# Patient Record
Sex: MALE | Race: WHITE | NOT HISPANIC OR LATINO | Employment: FULL TIME | URBAN - METROPOLITAN AREA
[De-identification: names, ages, dates, MRNs, and addresses within clinical notes are randomized per-mention and may not be internally consistent; named-entity substitution may affect disease eponyms.]

---

## 2018-09-03 ENCOUNTER — HOSPITAL ENCOUNTER (EMERGENCY)
Facility: HOSPITAL | Age: 43
Discharge: HOME | End: 2018-09-03
Attending: EMERGENCY MEDICINE
Payer: COMMERCIAL

## 2018-09-03 VITALS
DIASTOLIC BLOOD PRESSURE: 89 MMHG | SYSTOLIC BLOOD PRESSURE: 139 MMHG | TEMPERATURE: 99 F | HEART RATE: 88 BPM | BODY MASS INDEX: 27.24 KG/M2 | OXYGEN SATURATION: 95 % | WEIGHT: 173.94 LBS | RESPIRATION RATE: 18 BRPM

## 2018-09-03 DIAGNOSIS — J40 BRONCHITIS: ICD-10-CM

## 2018-09-03 DIAGNOSIS — R05.9 COUGH: ICD-10-CM

## 2018-09-03 DIAGNOSIS — Z72.0 TOBACCO ABUSE: ICD-10-CM

## 2018-09-03 DIAGNOSIS — B34.9 ACUTE VIRAL SYNDROME: Primary | ICD-10-CM

## 2018-09-03 LAB
FLUAV AG SPEC QL IA: NEGATIVE
FLUBV AG SPEC QL IA: NEGATIVE
SPECIMEN SOURCE: NORMAL

## 2018-09-03 PROCEDURE — 99284 EMERGENCY DEPT VISIT MOD MDM: CPT | Mod: 25

## 2018-09-03 PROCEDURE — 25000242 PHARM REV CODE 250 ALT 637 W/ HCPCS: Performed by: EMERGENCY MEDICINE

## 2018-09-03 PROCEDURE — 94640 AIRWAY INHALATION TREATMENT: CPT

## 2018-09-03 PROCEDURE — 63600175 PHARM REV CODE 636 W HCPCS: Performed by: EMERGENCY MEDICINE

## 2018-09-03 PROCEDURE — 87400 INFLUENZA A/B EACH AG IA: CPT

## 2018-09-03 PROCEDURE — 99900035 HC TECH TIME PER 15 MIN (STAT)

## 2018-09-03 RX ORDER — ALBUTEROL SULFATE 90 UG/1
1-2 AEROSOL, METERED RESPIRATORY (INHALATION) EVERY 6 HOURS PRN
Qty: 1 INHALER | Refills: 0 | Status: SHIPPED | OUTPATIENT
Start: 2018-09-03 | End: 2019-09-03

## 2018-09-03 RX ORDER — PREDNISONE 20 MG/1
60 TABLET ORAL
Status: COMPLETED | OUTPATIENT
Start: 2018-09-03 | End: 2018-09-03

## 2018-09-03 RX ORDER — ONDANSETRON 4 MG/1
4 TABLET, ORALLY DISINTEGRATING ORAL EVERY 8 HOURS PRN
Qty: 12 TABLET | Refills: 0 | Status: SHIPPED | OUTPATIENT
Start: 2018-09-03 | End: 2019-09-22 | Stop reason: CLARIF

## 2018-09-03 RX ORDER — PREDNISONE 10 MG/1
10 TABLET ORAL DAILY
Qty: 21 TABLET | Refills: 0 | Status: SHIPPED | OUTPATIENT
Start: 2018-09-03 | End: 2018-09-13

## 2018-09-03 RX ORDER — BENZONATATE 100 MG/1
100 CAPSULE ORAL 3 TIMES DAILY PRN
Qty: 20 CAPSULE | Refills: 0 | Status: SHIPPED | OUTPATIENT
Start: 2018-09-03 | End: 2018-09-13

## 2018-09-03 RX ORDER — GABAPENTIN 600 MG/1
600 TABLET ORAL 3 TIMES DAILY
COMMUNITY
End: 2024-01-03

## 2018-09-03 RX ORDER — CETIRIZINE HYDROCHLORIDE 10 MG/1
10 TABLET ORAL DAILY
Qty: 30 TABLET | Refills: 0 | Status: SHIPPED | OUTPATIENT
Start: 2018-09-03 | End: 2019-09-22 | Stop reason: CLARIF

## 2018-09-03 RX ORDER — IPRATROPIUM BROMIDE AND ALBUTEROL SULFATE 2.5; .5 MG/3ML; MG/3ML
3 SOLUTION RESPIRATORY (INHALATION)
Status: COMPLETED | OUTPATIENT
Start: 2018-09-03 | End: 2018-09-03

## 2018-09-03 RX ADMIN — PREDNISONE 60 MG: 20 TABLET ORAL at 06:09

## 2018-09-03 RX ADMIN — IPRATROPIUM BROMIDE AND ALBUTEROL SULFATE 3 ML: .5; 3 SOLUTION RESPIRATORY (INHALATION) at 06:09

## 2018-09-03 NOTE — ED PROVIDER NOTES
Encounter Date: 9/3/2018       History     Chief Complaint   Patient presents with    Emesis     Pt states he thinks he has a stomach bug, started vomiting saturday. Last time vomiting was yesterday evening      The history is provided by the patient.   Cough   This is a new problem. Illness onset: 2 days ago. The problem has been waxing and waning. The cough is non-productive. Maximum temperature: subjective fever. The fever has been present for 1 to 2 days. Associated symptoms include headaches, rhinorrhea, myalgias and wheezing. Pertinent negatives include no chest pain, no chills, no sweats, no sore throat and no shortness of breath. He has tried decongestants and cough syrup for the symptoms. The treatment provided mild relief. He is a smoker. His past medical history is significant for asthma. His past medical history does not include bronchitis, pneumonia, COPD or emphysema.     Review of patient's allergies indicates:  No Known Allergies  Past Medical History:   Diagnosis Date    Chronic pain     associated with injury     Past Surgical History:   Procedure Laterality Date    BRAIN SURGERY      EYE SURGERY      SHOULDER SURGERY      post trauma fell 19 feet     Family History   Problem Relation Age of Onset    Peripheral vascular disease Mother     Hypertension Mother     Cancer Father      Social History     Tobacco Use    Smoking status: Current Every Day Smoker     Types: Cigarettes    Smokeless tobacco: Never Used   Substance Use Topics    Alcohol use: No     Frequency: Never    Drug use: No     Review of Systems   Constitutional: Negative for chills and fever.   HENT: Positive for rhinorrhea. Negative for sore throat.    Respiratory: Positive for cough and wheezing. Negative for shortness of breath.    Cardiovascular: Negative for chest pain.   Gastrointestinal: Negative for nausea.   Genitourinary: Negative for dysuria.   Musculoskeletal: Positive for myalgias. Negative for back pain.    Skin: Negative for rash.   Neurological: Positive for headaches. Negative for weakness.   Hematological: Does not bruise/bleed easily.   All other systems reviewed and are negative.      Physical Exam     Initial Vitals [09/03/18 0553]   BP Pulse Resp Temp SpO2   (!) 151/93 81 18 98.8 °F (37.1 °C) 97 %      MAP       --         Vitals:    09/03/18 0553 09/03/18 0632 09/03/18 0658   BP: (!) 151/93  139/89   Pulse: 81 80 88   Resp: 18 18 18   Temp: 98.8 °F (37.1 °C)     TempSrc: Oral     SpO2: 97% 97% 95%   Weight: 78.9 kg (173 lb 15.1 oz)         Physical Exam    Nursing note and vitals reviewed.  Constitutional: He appears well-developed and well-nourished.   HENT:   Head: Normocephalic and atraumatic.   Mouth/Throat: Oropharynx is clear and moist.   Eyes: EOM are normal. Pupils are equal, round, and reactive to light.   Neck: Normal range of motion. Neck supple.   Cardiovascular: Normal rate, regular rhythm, normal heart sounds and intact distal pulses.   Pulmonary/Chest: No respiratory distress. He has wheezes. He has no rhonchi.   Expiratory wheezes bilaterally with coarse breath sounds   Abdominal: Soft. Bowel sounds are normal. There is no rebound.   Musculoskeletal: Normal range of motion. He exhibits no edema.   Neurological: He is alert and oriented to person, place, and time. He has normal strength. No cranial nerve deficit or sensory deficit.   Skin: Skin is warm and dry. Capillary refill takes less than 2 seconds. No rash noted.   Psychiatric: He has a normal mood and affect. His behavior is normal. Judgment and thought content normal.         ED Course   Procedures  Labs Reviewed   INFLUENZA A AND B ANTIGEN          Imaging Results          X-Ray Chest PA And Lateral (Final result)  Result time 09/03/18 07:21:26    Final result by ALBA Cagle Sr., MD (09/03/18 07:21:26)                 Impression:      1. The lungs are clear.  2. 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 The medial fracture fragment of the left clavicle is displaced superiorly by 13 mm.  3. There are healed fractures on the left side of the thoracic cage.  .      Electronically signed by: Bolivar Cagle MD  Date:    09/03/2018  Time:    07:21             Narrative:    EXAMINATION:  XR CHEST PA AND LATERAL    CLINICAL HISTORY:  Cough    COMPARISON:  None    FINDINGS:  The size and contour of the heart are normal. The lungs are clear. There is no pneumothorax or pleural effusion.  There are healed fractures on the left side of the thoracic cage.  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  The medial fracture fragment of the left clavicle is displaced superiorly by 13 mm.                                            Patient presents with upper respiratory and flulike symptoms. Based on my assessment in the ED, I do not suspect any respiratory, airway, pulmonary, cardiovascular (including myocarditis), metabolic, CNS, medical, or surgical emergency medical condition. I have discussed with the patient and/or caregiver signs and symptoms for secondary bacterial infections, such as pneumonia. I believe that the patient's symptoms are most consistent with a viral illness, possibly influenza. Patient is safe for discharge home with conservative therapy.           Clinical Impression:   The primary encounter diagnosis was Acute viral syndrome. Diagnoses of Cough, Tobacco abuse, and Bronchitis were also pertinent to this visit.      Disposition:   Disposition: Discharged  Condition: Stable                        Kayden Lin MD  09/03/18 0747       Kayden Lin MD  09/03/18 0751

## 2019-04-13 ENCOUNTER — HOSPITAL ENCOUNTER (EMERGENCY)
Facility: HOSPITAL | Age: 44
Discharge: HOME | End: 2019-04-13
Attending: EMERGENCY MEDICINE
Payer: COMMERCIAL

## 2019-04-13 VITALS
OXYGEN SATURATION: 97 % | TEMPERATURE: 99 F | HEART RATE: 94 BPM | HEIGHT: 67 IN | DIASTOLIC BLOOD PRESSURE: 78 MMHG | WEIGHT: 174.19 LBS | SYSTOLIC BLOOD PRESSURE: 132 MMHG | RESPIRATION RATE: 18 BRPM | BODY MASS INDEX: 27.34 KG/M2

## 2019-04-13 DIAGNOSIS — J06.9 UPPER RESPIRATORY TRACT INFECTION, UNSPECIFIED TYPE: ICD-10-CM

## 2019-04-13 DIAGNOSIS — R50.9 FEVER: ICD-10-CM

## 2019-04-13 DIAGNOSIS — J40 BRONCHITIS: Primary | ICD-10-CM

## 2019-04-13 LAB
ALBUMIN SERPL BCP-MCNC: 3.7 G/DL (ref 3.5–5.2)
ALP SERPL-CCNC: 69 U/L (ref 55–135)
ALT SERPL W/O P-5'-P-CCNC: 11 U/L (ref 10–44)
ANION GAP SERPL CALC-SCNC: 11 MMOL/L (ref 8–16)
AST SERPL-CCNC: 11 U/L (ref 10–40)
BASOPHILS # BLD AUTO: 0.03 K/UL (ref 0–0.2)
BASOPHILS NFR BLD: 0.2 % (ref 0–1.9)
BILIRUB SERPL-MCNC: 0.5 MG/DL (ref 0.1–1)
BILIRUB UR QL STRIP: NEGATIVE
BNP SERPL-MCNC: 77 PG/ML (ref 0–99)
BUN SERPL-MCNC: 17 MG/DL (ref 6–20)
CALCIUM SERPL-MCNC: 10.1 MG/DL (ref 8.7–10.5)
CHLORIDE SERPL-SCNC: 103 MMOL/L (ref 95–110)
CLARITY UR REFRACT.AUTO: CLEAR
CO2 SERPL-SCNC: 23 MMOL/L (ref 23–29)
COLOR UR AUTO: YELLOW
CREAT SERPL-MCNC: 1.2 MG/DL (ref 0.5–1.4)
DIFFERENTIAL METHOD: ABNORMAL
EOSINOPHIL # BLD AUTO: 0.2 K/UL (ref 0–0.5)
EOSINOPHIL NFR BLD: 1.4 % (ref 0–8)
ERYTHROCYTE [DISTWIDTH] IN BLOOD BY AUTOMATED COUNT: 14.2 % (ref 11.5–14.5)
EST. GFR  (AFRICAN AMERICAN): >60 ML/MIN/1.73 M^2
EST. GFR  (NON AFRICAN AMERICAN): >60 ML/MIN/1.73 M^2
GLUCOSE SERPL-MCNC: 100 MG/DL (ref 70–110)
GLUCOSE UR QL STRIP: NEGATIVE
HCT VFR BLD AUTO: 40.4 % (ref 40–54)
HGB BLD-MCNC: 14.1 G/DL (ref 14–18)
HGB UR QL STRIP: NEGATIVE
INFLUENZA A, MOLECULAR: NEGATIVE
INFLUENZA B, MOLECULAR: NEGATIVE
INR PPP: 1 (ref 0.8–1.2)
KETONES UR QL STRIP: NEGATIVE
LACTATE SERPL-SCNC: 1.3 MMOL/L (ref 0.5–2.2)
LEUKOCYTE ESTERASE UR QL STRIP: NEGATIVE
LIPASE SERPL-CCNC: 14 U/L (ref 4–60)
LYMPHOCYTES # BLD AUTO: 0.9 K/UL (ref 1–4.8)
LYMPHOCYTES NFR BLD: 7.1 % (ref 18–48)
MAGNESIUM SERPL-MCNC: 1.9 MG/DL (ref 1.6–2.6)
MCH RBC QN AUTO: 29.4 PG (ref 27–31)
MCHC RBC AUTO-ENTMCNC: 34.9 G/DL (ref 32–36)
MCV RBC AUTO: 84 FL (ref 82–98)
MONOCYTES # BLD AUTO: 1 K/UL (ref 0.3–1)
MONOCYTES NFR BLD: 8 % (ref 4–15)
NEUTROPHILS # BLD AUTO: 10.3 K/UL (ref 1.8–7.7)
NEUTROPHILS NFR BLD: 83.1 % (ref 38–73)
NITRITE UR QL STRIP: NEGATIVE
PH UR STRIP: 6 [PH] (ref 5–8)
PHOSPHATE SERPL-MCNC: 3.6 MG/DL (ref 2.7–4.5)
PLATELET # BLD AUTO: 176 K/UL (ref 150–350)
PMV BLD AUTO: 10.8 FL (ref 9.2–12.9)
POTASSIUM SERPL-SCNC: 3.7 MMOL/L (ref 3.5–5.1)
PROCALCITONIN SERPL IA-MCNC: 0.12 NG/ML
PROT SERPL-MCNC: 6.4 G/DL (ref 6–8.4)
PROT UR QL STRIP: NEGATIVE
PROTHROMBIN TIME: 10 SEC (ref 9–12.5)
RBC # BLD AUTO: 4.8 M/UL (ref 4.6–6.2)
SODIUM SERPL-SCNC: 137 MMOL/L (ref 136–145)
SP GR UR STRIP: 1.01 (ref 1–1.03)
SPECIMEN SOURCE: NORMAL
TROPONIN I SERPL DL<=0.01 NG/ML-MCNC: 0.11 NG/ML (ref 0–0.03)
URN SPEC COLLECT METH UR: NORMAL
UROBILINOGEN UR STRIP-ACNC: NEGATIVE EU/DL
WBC # BLD AUTO: 12.44 K/UL (ref 3.9–12.7)

## 2019-04-13 PROCEDURE — 84100 ASSAY OF PHOSPHORUS: CPT | Mod: ER

## 2019-04-13 PROCEDURE — 83735 ASSAY OF MAGNESIUM: CPT | Mod: ER

## 2019-04-13 PROCEDURE — 93010 ELECTROCARDIOGRAM REPORT: CPT | Mod: ,,, | Performed by: NUCLEAR MEDICINE

## 2019-04-13 PROCEDURE — 99284 EMERGENCY DEPT VISIT MOD MDM: CPT | Mod: 25,ER

## 2019-04-13 PROCEDURE — 87040 BLOOD CULTURE FOR BACTERIA: CPT

## 2019-04-13 PROCEDURE — 96374 THER/PROPH/DIAG INJ IV PUSH: CPT | Mod: ER

## 2019-04-13 PROCEDURE — 83880 ASSAY OF NATRIURETIC PEPTIDE: CPT | Mod: ER

## 2019-04-13 PROCEDURE — 84145 PROCALCITONIN (PCT): CPT | Mod: ER

## 2019-04-13 PROCEDURE — 93005 ELECTROCARDIOGRAM TRACING: CPT | Mod: ER

## 2019-04-13 PROCEDURE — 25000242 PHARM REV CODE 250 ALT 637 W/ HCPCS: Mod: ER | Performed by: EMERGENCY MEDICINE

## 2019-04-13 PROCEDURE — 93010 EKG 12-LEAD: ICD-10-PCS | Mod: ,,, | Performed by: NUCLEAR MEDICINE

## 2019-04-13 PROCEDURE — 27000221 HC OXYGEN, UP TO 24 HOURS: Mod: ER

## 2019-04-13 PROCEDURE — 85025 COMPLETE CBC W/AUTO DIFF WBC: CPT | Mod: ER

## 2019-04-13 PROCEDURE — 80053 COMPREHEN METABOLIC PANEL: CPT | Mod: ER

## 2019-04-13 PROCEDURE — 87502 INFLUENZA DNA AMP PROBE: CPT | Mod: ER

## 2019-04-13 PROCEDURE — 94640 AIRWAY INHALATION TREATMENT: CPT | Mod: ER

## 2019-04-13 PROCEDURE — 85610 PROTHROMBIN TIME: CPT | Mod: ER

## 2019-04-13 PROCEDURE — 81003 URINALYSIS AUTO W/O SCOPE: CPT | Mod: ER

## 2019-04-13 PROCEDURE — 63600175 PHARM REV CODE 636 W HCPCS: Mod: ER | Performed by: EMERGENCY MEDICINE

## 2019-04-13 PROCEDURE — 83605 ASSAY OF LACTIC ACID: CPT | Mod: ER

## 2019-04-13 PROCEDURE — 83690 ASSAY OF LIPASE: CPT | Mod: ER

## 2019-04-13 PROCEDURE — 84484 ASSAY OF TROPONIN QUANT: CPT | Mod: ER

## 2019-04-13 PROCEDURE — 93041 RHYTHM ECG TRACING: CPT | Mod: ER

## 2019-04-13 PROCEDURE — 25000003 PHARM REV CODE 250: Mod: ER | Performed by: EMERGENCY MEDICINE

## 2019-04-13 RX ORDER — IPRATROPIUM BROMIDE AND ALBUTEROL SULFATE 2.5; .5 MG/3ML; MG/3ML
3 SOLUTION RESPIRATORY (INHALATION)
Status: COMPLETED | OUTPATIENT
Start: 2019-04-13 | End: 2019-04-13

## 2019-04-13 RX ORDER — AZITHROMYCIN 250 MG/1
250 TABLET, FILM COATED ORAL DAILY
Qty: 6 TABLET | Refills: 0 | Status: SHIPPED | OUTPATIENT
Start: 2019-04-13 | End: 2019-09-22 | Stop reason: CLARIF

## 2019-04-13 RX ORDER — OXYCODONE HYDROCHLORIDE 30 MG/1
5 TABLET ORAL 3 TIMES DAILY PRN
COMMUNITY
End: 2020-04-24

## 2019-04-13 RX ORDER — METHYLPREDNISOLONE 4 MG/1
TABLET ORAL
Qty: 1 PACKAGE | Refills: 0 | Status: SHIPPED | OUTPATIENT
Start: 2019-04-13 | End: 2019-09-22 | Stop reason: CLARIF

## 2019-04-13 RX ORDER — METHYLPREDNISOLONE SOD SUCC 125 MG
125 VIAL (EA) INJECTION
Status: COMPLETED | OUTPATIENT
Start: 2019-04-13 | End: 2019-04-13

## 2019-04-13 RX ORDER — ALBUTEROL SULFATE 90 UG/1
1-2 AEROSOL, METERED RESPIRATORY (INHALATION) EVERY 6 HOURS PRN
Qty: 1 INHALER | Refills: 0 | OUTPATIENT
Start: 2019-04-13 | End: 2019-09-22

## 2019-04-13 RX ADMIN — METHYLPREDNISOLONE SODIUM SUCCINATE 125 MG: 125 INJECTION, POWDER, FOR SOLUTION INTRAMUSCULAR; INTRAVENOUS at 06:04

## 2019-04-13 RX ADMIN — IPRATROPIUM BROMIDE AND ALBUTEROL SULFATE 3 ML: .5; 3 SOLUTION RESPIRATORY (INHALATION) at 05:04

## 2019-04-13 RX ADMIN — SODIUM CHLORIDE 2370 ML: 0.9 INJECTION, SOLUTION INTRAVENOUS at 05:04

## 2019-04-13 NOTE — DISCHARGE INSTRUCTIONS
Regarding UPPER RESPIRATORY ILLNESS, for treatment, I encouraged patient to: drink plenty of fluids; get lots of rest; take medications as prescribed; use over-the-counter medications for symptomatic treatment;  and use a humidifier or steam in the bathroom to improve patency of upper airway.  Patient instructed to notify primary care provider, or return to the emergency department, if the they: have a cough most days or have a cough that returns frequently; begin coughing up blood; develop a high fever or shaking chills; have a low-grade fever for three or more days; develop thick, greenish mucus, especially if it has a bad smell; and experience shortness of breath or chest pain. For prevention, discussed with patient the importance of refraining from smoking (if applicable), getting annual influenza vaccines, reducing exposure to air pollution, and the need to frequently wash hands to avoid spread of infection.     Rest  Drink plenty of clear fluids   Nasal saline spray to clear nasal drainage and help with nasal congestion  Zyrtec or Claritin to help dry mucus and post nasal drip  Mucinex or Mucinex DM for cough and chest congestion  Tylenol or Ibuprofen for fever, headache and body aches  Warm salt water gargles for throat comfort  Chloraseptic spray or lozenges for throat comfort  RTC with no improvement or worsening    Regarding BRONCHITIS, for treatment, I encouraged patient to refrain from smoking, drink plenty of fluids, rest, take medications as prescribed, and to use a humidifier or steam in the bathroom. Patient instructed to notify primary care provider if: they have a cough most days or have a cough that returns frequently; are coughing up blood; have a high fever or shaking chills; have a low-grade fever for three or more days; develop thick, greenish mucus, especially if it has a bad smell; and feel short of breath or have chest pain. For prevention, discussed with patient the importance of not  smoking, getting annual influenza vaccines, reducing exposure to air pollution, and to frequently wash hands to avoid spread of infection.  Instructed patient to return to emergency department if they experience chest pain or shortness of breath and to follow up with primary care provider for re-evaluation.

## 2019-04-13 NOTE — ED PROVIDER NOTES
Encounter Date: 4/13/2019       History     Chief Complaint   Patient presents with    Fever     and body aches with chest pain.     The history is provided by the patient.   Shortness of Breath   This is a new problem. The problem occurs rarely.The current episode started 2 days ago. The problem has not changed since onset.Associated symptoms include a fever, sore throat, cough, sputum production and wheezing. Pertinent negatives include no headaches, no coryza, no rhinorrhea, no swollen glands, no ear pain, no neck pain, no hemoptysis, no PND, no orthopnea, no chest pain, no syncope, no vomiting, no abdominal pain, no rash, no leg pain, no leg swelling and no claudication. The problem's precipitants include smoke (pt states he smokes 2ppd and has been having worsening sob c cough). Risk factors include smoking. He has tried nothing for the symptoms. The treatment provided no relief. He has had no prior hospitalizations. He has had no prior ED visits. He has had no prior ICU admissions. Associated medical issues include COPD. Associated medical issues do not include asthma, pneumonia, chronic lung disease, PE, CAD, heart failure, past MI, DVT or recent surgery.     Review of patient's allergies indicates:  No Known Allergies  Past Medical History:   Diagnosis Date    Chronic pain     associated with injury     Past Surgical History:   Procedure Laterality Date    BRAIN SURGERY      EYE SURGERY      SHOULDER SURGERY      post trauma fell 19 feet     Family History   Problem Relation Age of Onset    Peripheral vascular disease Mother     Hypertension Mother     Cancer Father      Social History     Tobacco Use    Smoking status: Current Every Day Smoker     Types: Cigarettes    Smokeless tobacco: Never Used   Substance Use Topics    Alcohol use: No     Frequency: Never    Drug use: No     Review of Systems   Constitutional: Positive for fever.   HENT: Positive for sore throat. Negative for ear pain and  rhinorrhea.    Respiratory: Positive for cough, sputum production, shortness of breath and wheezing. Negative for hemoptysis.    Cardiovascular: Negative for chest pain, orthopnea, claudication, leg swelling, syncope and PND.   Gastrointestinal: Negative for abdominal pain, nausea and vomiting.   Genitourinary: Negative for dysuria.   Musculoskeletal: Negative for back pain and neck pain.   Skin: Negative for rash.   Neurological: Negative for weakness and headaches.   Hematological: Does not bruise/bleed easily.   All other systems reviewed and are negative.      Physical Exam     Initial Vitals [04/13/19 1630]   BP Pulse Resp Temp SpO2   119/73 103 20 98.8 °F (37.1 °C) (!) 91 %      MAP       --         Physical Exam    Nursing note and vitals reviewed.  Constitutional: He appears well-developed and well-nourished. He is not diaphoretic. No distress.   HENT:   Head: Normocephalic and atraumatic.   Right Ear: External ear and ear canal normal. Tympanic membrane is retracted. Decreased hearing is noted.   Left Ear: External ear and ear canal normal. Tympanic membrane is retracted. Decreased hearing is noted.   Nose: Nose normal. Right sinus exhibits no maxillary sinus tenderness and no frontal sinus tenderness. Left sinus exhibits no maxillary sinus tenderness and no frontal sinus tenderness.   Mouth/Throat: Uvula is midline and mucous membranes are normal. Posterior oropharyngeal erythema present. No oropharyngeal exudate or posterior oropharyngeal edema.   Eyes: EOM are normal. Pupils are equal, round, and reactive to light. No scleral icterus.   Neck: Normal range of motion. Neck supple. No thyromegaly present.   Cardiovascular: Normal rate, regular rhythm, normal heart sounds and intact distal pulses. Exam reveals no gallop and no friction rub.    No murmur heard.  Pulmonary/Chest: No respiratory distress. He has decreased breath sounds in the right lower field. He has wheezes in the right middle field, the  right lower field, the left middle field and the left lower field. He has rhonchi in the right middle field, the right lower field, the left middle field and the left lower field. He exhibits no tenderness.   Abdominal: Soft. Bowel sounds are normal. He exhibits no distension. There is no tenderness. There is no rebound and no guarding.   Musculoskeletal: Normal range of motion. He exhibits no edema or tenderness.   Lymphadenopathy:     He has no cervical adenopathy.   Neurological: He is alert and oriented to person, place, and time. He has normal strength. No cranial nerve deficit or sensory deficit.   Skin: Skin is warm and dry.   Psychiatric: He has a normal mood and affect. His behavior is normal. Judgment and thought content normal.         ED Course   Procedures  Labs Reviewed   CBC W/ AUTO DIFFERENTIAL - Abnormal; Notable for the following components:       Result Value    Gran # (ANC) 10.3 (*)     Lymph # 0.9 (*)     Gran% 83.1 (*)     Lymph% 7.1 (*)     All other components within normal limits   TROPONIN I - Abnormal; Notable for the following components:    Troponin I 0.112 (*)     All other components within normal limits   CULTURE, BLOOD    Narrative:     Aerobic and anaerobic   CULTURE, BLOOD    Narrative:     Aerobic and anaerobic   INFLUENZA A & B BY MOLECULAR   COMPREHENSIVE METABOLIC PANEL   LACTIC ACID, PLASMA   URINALYSIS, REFLEX TO URINE CULTURE    Narrative:     Preferred Collection Type->Urine, Clean Catch   MAGNESIUM   PHOSPHORUS   PROTIME-INR   B-TYPE NATRIURETIC PEPTIDE   LIPASE   PROCALCITONIN     EKG Readings: (Independently Interpreted)   Initial Reading: No STEMI. Rhythm: Normal Sinus Rhythm. Heart Rate: 99. Ectopy: No Ectopy. Conduction: Normal. ST Segments: Normal ST Segments. T Waves: Normal. Axis: Normal. Clinical Impression: Normal Sinus Rhythm     ECG Results          EKG 12-lead (Final result)  Result time 04/15/19 11:11:28    Final result by Interface, Lab In [facility] (04/15/19  "11:11:28)                 Narrative:    Test Reason : R50.9,    Vent. Rate : 099 BPM     Atrial Rate : 099 BPM     P-R Int : 132 ms          QRS Dur : 084 ms      QT Int : 332 ms       P-R-T Axes : 065 064 053 degrees     QTc Int : 426 ms    Normal sinus rhythm  Normal ECG  No previous ECGs available  Confirmed by MERVIN ROCHA MD ([id number]) on 4/15/2019 11:11:16 AM    Referred By: [email or web address]   SELF           Confirmed By:MERVIN ROCHA MD                            Imaging Results          X-Ray Chest AP Portable (Final result)  Result time 04/13/19 16:49:59    Final result by Idalia Cortez MD (Timothy) (04/13/19 16:49:59)                 Impression:      Clear lungs.      Electronically signed by: Idalia Cortez MD  Date:    04/13/2019  Time:    16:49             Narrative:    EXAMINATION:  XR CHEST AP PORTABLE    CLINICAL HISTORY:  <Diagnosis>, Sepsis;    COMPARISON:  Comparison 09/03/2018    FINDINGS:  Heart size is normal. The lung fields are clear. No acute cardiopulmonary infiltrate.  Multiple old healed left posterior rib fractures.                                    Vitals:    04/13/19 1630 04/13/19 1655 04/13/19 1717 04/13/19 1733   BP: 119/73  118/67    Pulse: 103 87 87 77   Resp: 20  (!) 21 16   Temp: 98.8 °F (37.1 °C)      TempSrc: Oral      SpO2: (!) 91%  (!) 91% 99%   Weight: 79 kg (174 lb 2.6 oz)      Height: 5' 7" (1.702 m)       04/13/19 1802 04/13/19 1815   BP: 132/78    Pulse: 95 94   Resp: 20 18   Temp:     TempSrc:     SpO2: 98% 97%   Weight:     Height:         Results for orders placed or performed during the hospital encounter of 04/13/19   Blood culture x two cultures. Draw prior to antibiotics.   Result Value Ref Range    Blood Culture, Routine No Growth to date     Blood Culture, Routine No Growth to date     Blood Culture, Routine No Growth to date    Blood culture x two cultures. Draw prior to antibiotics.   Result Value Ref Range    Blood Culture, Routine No Growth to date     " Blood Culture, Routine No Growth to date     Blood Culture, Routine No Growth to date    Influenza A & B by Molecular   Result Value Ref Range    Influenza A, Molecular Negative Negative    Influenza B, Molecular Negative Negative    Flu A & B Source NP    CBC auto differential   Result Value Ref Range    WBC 12.44 3.90 - 12.70 K/uL    RBC 4.80 4.60 - 6.20 M/uL    Hemoglobin 14.1 14.0 - 18.0 g/dL    Hematocrit 40.4 40.0 - 54.0 %    MCV 84 82 - 98 fL    MCH 29.4 27.0 - 31.0 pg    MCHC 34.9 32.0 - 36.0 g/dL    RDW 14.2 11.5 - 14.5 %    Platelets 176 150 - 350 K/uL    MPV 10.8 9.2 - 12.9 fL    Gran # (ANC) 10.3 (H) 1.8 - 7.7 K/uL    Lymph # 0.9 (L) 1.0 - 4.8 K/uL    Mono # 1.0 0.3 - 1.0 K/uL    Eos # 0.2 0.0 - 0.5 K/uL    Baso # 0.03 0.00 - 0.20 K/uL    Gran% 83.1 (H) 38.0 - 73.0 %    Lymph% 7.1 (L) 18.0 - 48.0 %    Mono% 8.0 4.0 - 15.0 %    Eosinophil% 1.4 0.0 - 8.0 %    Basophil% 0.2 0.0 - 1.9 %    Differential Method Automated    Comprehensive metabolic panel   Result Value Ref Range    Sodium 137 136 - 145 mmol/L    Potassium 3.7 3.5 - 5.1 mmol/L    Chloride 103 95 - 110 mmol/L    CO2 23 23 - 29 mmol/L    Glucose 100 70 - 110 mg/dL    BUN, Bld 17 6 - 20 mg/dL    Creatinine 1.2 0.5 - 1.4 mg/dL    Calcium 10.1 8.7 - 10.5 mg/dL    Total Protein 6.4 6.0 - 8.4 g/dL    Albumin 3.7 3.5 - 5.2 g/dL    Total Bilirubin 0.5 0.1 - 1.0 mg/dL    Alkaline Phosphatase 69 55 - 135 U/L    AST 11 10 - 40 U/L    ALT 11 10 - 44 U/L    Anion Gap 11 8 - 16 mmol/L    eGFR if African American >60.0 >60 mL/min/1.73 m^2    eGFR if non African American >60.0 >60 mL/min/1.73 m^2   Lactic acid, plasma #1   Result Value Ref Range    Lactate (Lactic Acid) 1.3 0.5 - 2.2 mmol/L   Urinalysis, Reflex to Urine Culture Urine, Clean Catch   Result Value Ref Range    Specimen UA Urine, Clean Catch     Color, UA Yellow Yellow, Straw, [place]    Appearance, UA Clear Clear    pH, UA 6.0 5.0 - 8.0    Specific Gravity, UA 1.010 1.005 - 1.030    Protein, UA  Negative Negative    Glucose, UA Negative Negative    Ketones, UA Negative Negative    Bilirubin (UA) Negative Negative    Occult Blood UA Negative Negative    Nitrite, UA Negative Negative    Urobilinogen, UA Negative <2.0 EU/dL    Leukocytes, UA Negative Negative   Magnesium   Result Value Ref Range    Magnesium 1.9 1.6 - 2.6 mg/dL   Phosphorus   Result Value Ref Range    Phosphorus 3.6 2.7 - 4.5 mg/dL   Protime-INR   Result Value Ref Range    Prothrombin Time 10.0 9.0 - 12.5 sec    INR 1.0 0.8 - 1.2   Brain natriuretic peptide   Result Value Ref Range    BNP 77 0 - 99 pg/mL   Lipase   Result Value Ref Range    Lipase 14 4 - 60 U/L   Troponin I   Result Value Ref Range    Troponin I 0.112 (H) 0.000 - 0.026 ng/mL   Procalcitonin   Result Value Ref Range    Procalcitonin 0.12 <0.25 ng/mL         Imaging Results          X-Ray Chest AP Portable (Final result)  Result time 04/13/19 16:49:59    Final result by Idalia Cortez MD (Timothy) (04/13/19 16:49:59)                 Impression:      Clear lungs.      Electronically signed by: Idalia Cortez MD  Date:    04/13/2019  Time:    16:49             Narrative:    EXAMINATION:  XR CHEST AP PORTABLE    CLINICAL HISTORY:  <Diagnosis>, Sepsis;    COMPARISON:  Comparison 09/03/2018    FINDINGS:  Heart size is normal. The lung fields are clear. No acute cardiopulmonary infiltrate.  Multiple old healed left posterior rib fractures.                                Medications   sodium chloride 0.9% bolus 2,370 mL (2,370 mLs Intravenous New Bag 4/13/19 9223)   albuterol-ipratropium 2.5 mg-0.5 mg/3 mL nebulizer solution 3 mL (3 mLs Nebulization Given 4/13/19 9103)   methylPREDNISolone sodium succinate injection 125 mg (125 mg Intravenous Given 4/13/19 1813)          5:05 PM  - Re-evaluation:  The patient is resting comfortably and is in no acute distress. Discussed test results and notified of pending labs. Answered questions at this time.     6:05 PM - Re-evaluation: The  patient is resting comfortably and is in no acute distress. He states that his symptoms have improved after treatment within ER. Discussed test results, shared treatment plan, specific conditions for return, and importance of follow up with patient and family.  He understands and agrees with the plan as discussed. Answered  his questions at this time. He has remained hemodynamically stable throughout the ED course and is appropriate for discharge home.     Regarding BRONCHITIS, for treatment, I encouraged patient to refrain from smoking, drink plenty of fluids, rest, take medications as prescribed, and to use a humidifier or steam in the bathroom. Patient instructed to notify primary care provider if: they have a cough most days or have a cough that returns frequently; are coughing up blood; have a high fever or shaking chills; have a low-grade fever for three or more days; develop thick, greenish mucus, especially if it has a bad smell; and feel short of breath or have chest pain. For prevention, discussed with patient the importance of not smoking, getting annual influenza vaccines, reducing exposure to air pollution, and to frequently wash hands to avoid spread of infection.  Instructed patient to return to emergency department if they experience chest pain or shortness of breath and to follow up with primary care provider for re-evaluation.    Regarding UPPER RESPIRATORY ILLNESS, for treatment, I encouraged patient to: drink plenty of fluids; get lots of rest; take medications as prescribed; use over-the-counter medications for symptomatic treatment;  and use a humidifier or steam in the bathroom to improve patency of upper airway.  Patient instructed to notify primary care provider, or return to the emergency department, if the they: have a cough most days or have a cough that returns frequently; begin coughing up blood; develop a high fever or shaking chills; have a low-grade fever for three or more days;  develop thick, greenish mucus, especially if it has a bad smell; and experience shortness of breath or chest pain. For prevention, discussed with patient the importance of refraining from smoking (if applicable), getting annual influenza vaccines, reducing exposure to air pollution, and the need to frequently wash hands to avoid spread of infection.     Pre-hypertension/Hypertension: The pt has been informed that they may have pre-hypertension or hypertension based on a blood pressure reading in the ED. I recommend that the pt call the PCP listed on their discharge instructions or a physician of their choice this week to arrange f/u for further evaluation of possible pre-hypertension or hypertension.     Toño Nelson was given a handout which discussed their disease process, precautions, and instructions for follow-up and therapy.    Follow-up Information     Aurelio Dockery DO. Schedule an appointment as soon as possible for a visit in 1 week.    Specialty:  Family Medicine  Contact information:  [address] [address] [address]  [phone]             [facility].    Specialty:  Emergency Medicine  Why:  As needed, If symptoms worsen  Contact information:  [address] [address] [id number] [phone]                    Medication List      START taking these medications    azithromycin 250 MG tablet  Commonly known as:  Z-DONN  Take 1 tablet (250 mg total) by mouth once daily. Take first 2 tablets together, then 1 every day until finished.     methylPREDNISolone 4 mg tablet  Commonly known as:  MEDROL DOSEPACK  use as directed        CHANGE how you take these medications    * albuterol 90 mcg/actuation inhaler  Commonly known as:  PROVENTIL/VENTOLIN HFA  Inhale 1-2 puffs into the lungs every 6 (six) hours as needed for Wheezing (coughing).  What changed:  Another medication with the same name was added. Make sure you understand how and when to take each.     *  albuterol 90 mcg/actuation inhaler  Commonly known as:  PROVENTIL/VENTOLIN HFA  Inhale 1-2 puffs into the lungs every 6 (six) hours as needed for Wheezing. Rescue  What changed:  You were already taking a medication with the same name, and this prescription was added. Make sure you understand how and when to take each.         * This list has 2 medication(s) that are the same as other medications prescribed for you. Read the directions carefully, and ask your doctor or other care provider to review them with you.            ASK your doctor about these medications    cetirizine 10 MG tablet  Commonly known as:  ZYRTEC  Take 1 tablet (10 mg total) by mouth once daily.     cyclobenzaprine 10 MG tablet  Commonly known as:  FLEXERIL     gabapentin 600 MG tablet  Commonly known as:  NEURONTIN     ondansetron 4 MG Tbdl  Commonly known as:  ZOFRAN-ODT  Take 1 tablet (4 mg total) by mouth every 8 (eight) hours as needed.     oxyCODONE 30 MG Tab  Commonly known as:  ROXICODONE     oxyCODONE-acetaminophen  mg per tablet  Commonly known as:  PERCOCET     promethazine 25 MG tablet  Commonly known as:  PHENERGAN  Take 1 tablet (25 mg total) by mouth every 6 (six) hours as needed for Nausea.     zolpidem 12.5 MG CR tablet  Commonly known as:  AMBIEN CR           Where to Get Your Medications      You can get these medications from any pharmacy    Bring a paper prescription for each of these medications  · albuterol 90 mcg/actuation inhaler  · azithromycin 250 MG tablet  · methylPREDNISolone 4 mg tablet        Current Discharge Medication List            ED Diagnosis  1. Bronchitis    2. Fever    3. Upper respiratory tract infection, unspecified type                             Clinical Impression:       ICD-10-CM ICD-9-CM   1. Bronchitis J40 490   2. Fever R50.9 780.60   3. Upper respiratory tract infection, unspecified type J06.9 465.9         Disposition:   Disposition: Discharged  Condition: Stable                         Jarred Walker Jr., MD  04/17/19 4075

## 2019-04-19 LAB
BACTERIA BLD CULT: NORMAL
BACTERIA BLD CULT: NORMAL

## 2019-09-22 ENCOUNTER — HOSPITAL ENCOUNTER (EMERGENCY)
Facility: HOSPITAL | Age: 44
Discharge: HOME | End: 2019-09-22
Attending: EMERGENCY MEDICINE
Payer: COMMERCIAL

## 2019-09-22 VITALS
SYSTOLIC BLOOD PRESSURE: 126 MMHG | RESPIRATION RATE: 18 BRPM | HEIGHT: 67 IN | TEMPERATURE: 98 F | HEART RATE: 63 BPM | DIASTOLIC BLOOD PRESSURE: 84 MMHG | WEIGHT: 157.88 LBS | BODY MASS INDEX: 24.78 KG/M2 | OXYGEN SATURATION: 97 %

## 2019-09-22 DIAGNOSIS — R20.0 NUMBNESS OF FINGERS: ICD-10-CM

## 2019-09-22 DIAGNOSIS — Z72.0 TOBACCO ABUSE: Primary | ICD-10-CM

## 2019-09-22 DIAGNOSIS — R07.9 CHEST PAIN: ICD-10-CM

## 2019-09-22 DIAGNOSIS — R10.13 DYSPEPSIA: ICD-10-CM

## 2019-09-22 DIAGNOSIS — R03.0 ELEVATED BLOOD PRESSURE READING: ICD-10-CM

## 2019-09-22 LAB
ALBUMIN SERPL BCP-MCNC: 4.5 G/DL (ref 3.5–5.2)
ALP SERPL-CCNC: 89 U/L (ref 55–135)
ALT SERPL W/O P-5'-P-CCNC: 14 U/L (ref 10–44)
ANION GAP SERPL CALC-SCNC: 10 MMOL/L (ref 8–16)
AST SERPL-CCNC: 8 U/L (ref 10–40)
BASOPHILS # BLD AUTO: 0.05 K/UL (ref 0–0.2)
BASOPHILS NFR BLD: 0.7 % (ref 0–1.9)
BILIRUB SERPL-MCNC: 0.6 MG/DL (ref 0.1–1)
BNP SERPL-MCNC: 21 PG/ML (ref 0–99)
BUN SERPL-MCNC: 17 MG/DL (ref 6–20)
CALCIUM SERPL-MCNC: 9.6 MG/DL (ref 8.7–10.5)
CHLORIDE SERPL-SCNC: 108 MMOL/L (ref 95–110)
CO2 SERPL-SCNC: 25 MMOL/L (ref 23–29)
CREAT SERPL-MCNC: 1.3 MG/DL (ref 0.5–1.4)
DIFFERENTIAL METHOD: NORMAL
EOSINOPHIL # BLD AUTO: 0.1 K/UL (ref 0–0.5)
EOSINOPHIL NFR BLD: 1.9 % (ref 0–8)
ERYTHROCYTE [DISTWIDTH] IN BLOOD BY AUTOMATED COUNT: 14.4 % (ref 11.5–14.5)
EST. GFR  (AFRICAN AMERICAN): >60 ML/MIN/1.73 M^2
EST. GFR  (NON AFRICAN AMERICAN): >60 ML/MIN/1.73 M^2
GLUCOSE SERPL-MCNC: 85 MG/DL (ref 70–110)
HCT VFR BLD AUTO: 45 % (ref 40–54)
HGB BLD-MCNC: 15.9 G/DL (ref 14–18)
LYMPHOCYTES # BLD AUTO: 1.8 K/UL (ref 1–4.8)
LYMPHOCYTES NFR BLD: 23.6 % (ref 18–48)
MCH RBC QN AUTO: 29.9 PG (ref 27–31)
MCHC RBC AUTO-ENTMCNC: 35.3 G/DL (ref 32–36)
MCV RBC AUTO: 85 FL (ref 82–98)
MONOCYTES # BLD AUTO: 0.7 K/UL (ref 0.3–1)
MONOCYTES NFR BLD: 9.2 % (ref 4–15)
NEUTROPHILS # BLD AUTO: 4.8 K/UL (ref 1.8–7.7)
NEUTROPHILS NFR BLD: 64.6 % (ref 38–73)
PLATELET # BLD AUTO: 265 K/UL (ref 150–350)
PMV BLD AUTO: 10.2 FL (ref 9.2–12.9)
POTASSIUM SERPL-SCNC: 3.4 MMOL/L (ref 3.5–5.1)
PROT SERPL-MCNC: 7.2 G/DL (ref 6–8.4)
RBC # BLD AUTO: 5.31 M/UL (ref 4.6–6.2)
SODIUM SERPL-SCNC: 143 MMOL/L (ref 136–145)
TROPONIN I SERPL DL<=0.01 NG/ML-MCNC: <0.006 NG/ML (ref 0–0.03)
TROPONIN I SERPL DL<=0.01 NG/ML-MCNC: <0.006 NG/ML (ref 0–0.03)
WBC # BLD AUTO: 7.42 K/UL (ref 3.9–12.7)

## 2019-09-22 PROCEDURE — 84484 ASSAY OF TROPONIN QUANT: CPT | Mod: ER

## 2019-09-22 PROCEDURE — 96375 TX/PRO/DX INJ NEW DRUG ADDON: CPT | Mod: ER

## 2019-09-22 PROCEDURE — 93010 EKG 12-LEAD: ICD-10-PCS | Mod: ,,, | Performed by: INTERNAL MEDICINE

## 2019-09-22 PROCEDURE — 93005 ELECTROCARDIOGRAM TRACING: CPT | Mod: ER

## 2019-09-22 PROCEDURE — 85025 COMPLETE CBC W/AUTO DIFF WBC: CPT | Mod: ER

## 2019-09-22 PROCEDURE — 80053 COMPREHEN METABOLIC PANEL: CPT | Mod: ER

## 2019-09-22 PROCEDURE — 63600175 PHARM REV CODE 636 W HCPCS: Mod: ER | Performed by: EMERGENCY MEDICINE

## 2019-09-22 PROCEDURE — 83880 ASSAY OF NATRIURETIC PEPTIDE: CPT | Mod: ER

## 2019-09-22 PROCEDURE — 93010 ELECTROCARDIOGRAM REPORT: CPT | Mod: ,,, | Performed by: INTERNAL MEDICINE

## 2019-09-22 PROCEDURE — 99285 EMERGENCY DEPT VISIT HI MDM: CPT | Mod: 25,ER

## 2019-09-22 PROCEDURE — 96374 THER/PROPH/DIAG INJ IV PUSH: CPT | Mod: ER

## 2019-09-22 RX ORDER — TRAMADOL HYDROCHLORIDE 50 MG/1
50 TABLET ORAL EVERY 6 HOURS
COMMUNITY
End: 2020-04-24

## 2019-09-22 RX ORDER — TIZANIDINE 4 MG/1
4 TABLET ORAL EVERY 12 HOURS PRN
COMMUNITY
End: 2020-04-24

## 2019-09-22 RX ORDER — MORPHINE SULFATE 4 MG/ML
4 INJECTION, SOLUTION INTRAMUSCULAR; INTRAVENOUS
Status: COMPLETED | OUTPATIENT
Start: 2019-09-22 | End: 2019-09-22

## 2019-09-22 RX ORDER — IBUPROFEN 800 MG/1
800 TABLET ORAL EVERY 8 HOURS PRN
Qty: 30 TABLET | Refills: 0 | Status: SHIPPED | OUTPATIENT
Start: 2019-09-22 | End: 2024-01-03

## 2019-09-22 RX ORDER — ONDANSETRON 2 MG/ML
4 INJECTION INTRAMUSCULAR; INTRAVENOUS ONCE
Status: COMPLETED | OUTPATIENT
Start: 2019-09-22 | End: 2019-09-22

## 2019-09-22 RX ORDER — FAMOTIDINE 20 MG/1
20 TABLET, FILM COATED ORAL EVERY 12 HOURS
Qty: 30 TABLET | Refills: 0 | Status: SHIPPED | OUTPATIENT
Start: 2019-09-22 | End: 2024-01-03

## 2019-09-22 RX ORDER — KETOROLAC TROMETHAMINE 10 MG/1
10 TABLET, FILM COATED ORAL EVERY 6 HOURS
COMMUNITY
End: 2019-09-22 | Stop reason: CLARIF

## 2019-09-22 RX ADMIN — ONDANSETRON 4 MG: 2 INJECTION INTRAMUSCULAR; INTRAVENOUS at 08:09

## 2019-09-22 RX ADMIN — MORPHINE SULFATE 4 MG: 4 INJECTION, SOLUTION INTRAMUSCULAR; INTRAVENOUS at 08:09

## 2019-09-22 NOTE — ED PROVIDER NOTES
"   History     Chief Complaint   Patient presents with    Arm Injury     left arm pain from hand up to shoulder, reports numbness and burning        Review of patient's allergies indicates:  No Known Allergies    History of Present Illness   HPI    9/22/2019, 8:22 AM  The history is provided by the patient    Toño Nelson is a 44 y.o. male presenting to the ED for left hand pain.   Onset: this morning when he woke up.   The pain started this morning on the left hand.  It is migrating up the left arm, to the elbow and now to shoulder.  There is a "Pulling" pain to the left shoulder.   The pain is described as "throbbing."  There is numbness to the 5th, 4th, and 3rd digit of the left hand.  Normal sensation to the L forearm/arm.   Rate 7/10.   Nothing makes it better/worse.   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 Patient denies previous: MI, CAD, CVA, DM, elevated cholesterol.  Patient had been put on antihypertensive medication but has not been compliant with it.   Patient also has been chronically prescribed her oxycodone 30 mg 3 times a day. He did stop taking his 30 mg oxycodone approximately week ago because he is concerned about " passing a worker's physical."  He denies any diaphoresis, nausea, vomiting, or diarrhea.  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      Arrival mode:  Personal Vehicle    PCP: Aurelio Dockery DO     Allergies:  Review of patient's allergies indicates:  No Known Allergies    Past Medical History:  Past Medical History:   Diagnosis Date    Chronic pain     associated with injury       Past Surgical History:  Past Surgical History:   Procedure Laterality Date    BRAIN SURGERY "      EYE SURGERY      SHOULDER SURGERY      post trauma fell 19 feet         Family History:  Family History   Problem Relation Age of Onset    Peripheral vascular disease Mother     Hypertension Mother     Cancer Father        Social History:  Social History     Tobacco Use    Smoking status: Current Every Day Smoker     Packs/day: 2.00     Types: Cigarettes    Smokeless tobacco: Never Used   Substance and Sexual Activity    Alcohol use: No     Frequency: Never    Drug use: No    Sexual activity: Not on file        Review of Systems   Review of Systems   Constitutional: Negative for fever.   HENT: Negative for rhinorrhea and sore throat.    Respiratory: Negative for chest tightness and shortness of breath.    Cardiovascular: Negative for chest pain.   Gastrointestinal: Negative for abdominal pain, nausea and vomiting.   Genitourinary: Negative for dysuria.   Musculoskeletal: Positive for arthralgias (Left shoulder, left hand). Negative for back pain, joint swelling, neck pain and neck stiffness.   Skin: Negative for rash.   Neurological: Positive for facial asymmetry (Chronic:  left sided facial nerve palsy) and numbness (Left 5th, 4th and 3rd digit left hand.   Not involving 1/2 digit, forearm, or arm.). Negative for dizziness, seizures, speech difficulty, weakness and headaches.   Hematological: Does not bruise/bleed easily.   Psychiatric/Behavioral: The patient is nervous/anxious.           Physical Exam     Initial Vitals [09/22/19 0820]   BP Pulse Resp Temp SpO2   (!) 151/95 80 20 97.7 °F (36.5 °C) 97 %      MAP       --          Physical Exam    Nursing Notes and Vital Signs Reviewed.  Constitutional: Patient is in no apparent distress. Well-developed and well-nourished.  Patient is somewhat anxious appearing.  Head: Atraumatic. Normocephalic.  Eyes: PERRL. EOM intact. Conjunctivae are not pale. No scleral icterus.  ENT: Mucous membranes are moist. Oropharynx is clear and symmetric.  tongue  "protrudes midline.  There is a left-sided facial nerve palsy which is present involving the forehead.  The left pupil is dilated.  Neck: Supple. Full ROM. No lymphadenopathy.  Cardiovascular: Regular rate. Regular rhythm. No murmurs, rubs, or gallops. Distal pulses are 2+ and symmetric.  Pulmonary/Chest: No respiratory distress. Clear to auscultation bilaterally. No wheezing or rales.  Abdominal: Soft and non-distended.  There is no tenderness.  No rebound, guarding, or rigidity. Good bowel sounds.  Genitourinary: No CVA tenderness  Musculoskeletal: Moves all extremities. No obvious deformities. No edema. No calf tenderness.  Left shoulder:  There is deformity present of the left shoulder indicative of previous injury. There is a scar that is present.  There is decreased range of motion-patient is unable to fully extend his left shoulder secondary to previous injury.  Left elbow.  There is full range of motion of the left elbow.  Left hand:  No deformity noted.  Intact to median, ulnar, and radial nerves.  There is subjective numbness to the 5th, 4th, and 3rd digit volarly.  Sensation is intact to the 1st and 2nd digit of the left fingers.  Radial pulses are equal bilaterally.  Skin: Warm and dry.  Neurological:  Alert, awake, and appropriate.  Normal speech.  there is a left-sided facial nerve palsy.  According to patient, his facial expressions are not different from baseline.  Finger-to-nose intact. Negative pronator drift.  Psychiatric: Normal affect. Good eye contact. Appropriate in content.     ED Course     Procedures    ED Vital Signs:  Vitals:    09/22/19 0820 09/22/19 0832 09/22/19 0851 09/22/19 0950   BP: (!) 151/95   (!) 150/89   Pulse: 80 81 74 63   Resp: 20   18   Temp: 97.7 °F (36.5 °C)      TempSrc: Oral      SpO2: 97%   98%   Weight: 71.6 kg (157 lb 13.6 oz)      Height: 5' 7" (1.702 m)       09/22/19 1142 09/22/19 1224   BP: (!) 128/94 126/84   Pulse: 64 63   Resp: 20 18   Temp:     TempSrc:   "   SpO2: 97% 97%   Weight:     Height:         Abnormal Lab Results:  Labs Reviewed   COMPREHENSIVE METABOLIC PANEL - Abnormal; Notable for the following components:       Result Value    Potassium 3.4 (*)     AST 8 (*)     All other components within normal limits   CBC W/ AUTO DIFFERENTIAL   TROPONIN I   B-TYPE NATRIURETIC PEPTIDE   TROPONIN I        All Lab Results:  Results for orders placed or performed during the hospital encounter of 09/22/19   CBC auto differential   Result Value Ref Range    WBC 7.42 3.90 - 12.70 K/uL    RBC 5.31 4.60 - 6.20 M/uL    Hemoglobin 15.9 14.0 - 18.0 g/dL    Hematocrit 45.0 40.0 - 54.0 %    Mean Corpuscular Volume 85 82 - 98 fL    Mean Corpuscular Hemoglobin 29.9 27.0 - 31.0 pg    Mean Corpuscular Hemoglobin Conc 35.3 32.0 - 36.0 g/dL    RDW 14.4 11.5 - 14.5 %    Platelets 265 150 - 350 K/uL    MPV 10.2 9.2 - 12.9 fL    Gran # (ANC) 4.8 1.8 - 7.7 K/uL    Lymph # 1.8 1.0 - 4.8 K/uL    Mono # 0.7 0.3 - 1.0 K/uL    Eos # 0.1 0.0 - 0.5 K/uL    Baso # 0.05 0.00 - 0.20 K/uL    Gran% 64.6 38.0 - 73.0 %    Lymph% 23.6 18.0 - 48.0 %    Mono% 9.2 4.0 - 15.0 %    Eosinophil% 1.9 0.0 - 8.0 %    Basophil% 0.7 0.0 - 1.9 %    Differential Method Automated    Comprehensive metabolic panel   Result Value Ref Range    Sodium 143 136 - 145 mmol/L    Potassium 3.4 (L) 3.5 - 5.1 mmol/L    Chloride 108 95 - 110 mmol/L    CO2 25 23 - 29 mmol/L    Glucose 85 70 - 110 mg/dL    BUN, Bld 17 6 - 20 mg/dL    Creatinine 1.3 0.5 - 1.4 mg/dL    Calcium 9.6 8.7 - 10.5 mg/dL    Total Protein 7.2 6.0 - 8.4 g/dL    Albumin 4.5 3.5 - 5.2 g/dL    Total Bilirubin 0.6 0.1 - 1.0 mg/dL    Alkaline Phosphatase 89 55 - 135 U/L    AST 8 (L) 10 - 40 U/L    ALT 14 10 - 44 U/L    Anion Gap 10 8 - 16 mmol/L    eGFR if African American >60.0 >60 mL/min/1.73 m^2    eGFR if non African American >60.0 >60 mL/min/1.73 m^2   Troponin I #1   Result Value Ref Range    Troponin I <0.006 0.000 - 0.026 ng/mL   B-Type natriuretic peptide  (BNP)   Result Value Ref Range    BNP 21 0 - 99 pg/mL   Troponin I #2   Result Value Ref Range    Troponin I <0.006 0.000 - 0.026 ng/mL         The EKG was ordered, reviewed, and independently interpreted by the ED provider.  EKG:  Rate is 78 beats per minute.  Sinus rhythm.  Normal axis.  No acute ST or T-wave changes appreciated.          Imaging Results:  Imaging Results          X-Ray Chest AP Portable (Final result)  Result time 09/22/19 09:26:12    Final result by Rishabh Sherwood Jr., MD (09/22/19 09:26:12)                 Impression:      No acute findings or interval change.      Electronically signed by: Rishabh Sherwood Jr., MD  Date:    09/22/2019  Time:    09:26             Narrative:    EXAMINATION:  XR CHEST AP PORTABLE    CLINICAL HISTORY:  Chest Pain;    COMPARISON:  Prior radiograph from April 13, 2019.    FINDINGS:  Lungs are clear.  Heart size within normal limits.Old left-sided rib fractures with bony bridging are unchanged..                               CT Head Without Contrast (Final result)  Result time 09/22/19 09:24:10    Final result by Rishabh Sherwood Jr., MD (09/22/19 09:24:10)                 Impression:      1. 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  This raises the concern for possible prior cardioembolic disease.  2. No acute findings.  All CT scans at this facility use dose modulation, iterative reconstruction, and/or weight base dosing when appropriate to reduce radiation dose to as low as reasonably achievable.      Electronically signed by: Rishabh Sherwood Jr., MD  Date:    09/22/2019  Time:    09:24             Narrative:    EXAMINATION:  CT HEAD WITHOUT CONTRAST    CLINICAL HISTORY:  Anesthesia of skinStroke;finger numbs;    TECHNIQUE:  Contiguous axial images were obtained from the skull base through the vertex without intravenous contrast.    COMPARISON:  None    FINDINGS:  No intracranial hemorrhage. No mass effect or midline shift. There are  peripheral, old infarcts involving the right temporal lobe, lateral right frontal lobe, and lateral left frontal lobe.  The ventricles and sulci are normal in size and configuration. The paranasal sinuses and mastoid air cells are clear.  No concerning osseous findings.                                 The Emergency Provider reviewed the vital signs and test results, which are outlined above.     ED Discussion     ED Course as of Sep 22 1504   Sun Sep 22, 2019   1224 Results for BHAVANA VELASCO (MRN [id number]) as of 9/22/2019 12:23    9/22/2019 08:30  Troponin I: <0.006    9/22/2019 09:18    9/22/2019 09:19    9/22/2019 11:42  Troponin I: <0.006      [LB]   1305 Discussed results with patient.  He is requesting Mortrin 800 mg     [LB]      ED Course User Index  [LB] Tammy Hernandez DO     [facility] reviewed:      1305 Reassessment: Dr. Hernandez reassessed the pt.  Family member also states that he has been using a Tums frequently over-the-counter.  Discussed dietary restrictions.  Patient counseled on smoking.  Patient given prescription for ibuprofen 800 mg as well as Pepcid.  The pt is resting comfortably and is NAD.  Pt states their sx have improved. Discussed test results, shared treatment plan, specific conditions for return, and the need for f/u.  Answered their questions at this time.  Pt understands and agrees to the plan.  The pt has remained hemodynamically stable through ED course and is stable for discharge.      I discussed with patient and/or family/caretaker that evaluation in the ED does not suggest any emergent or life threatening medical conditions requiring immediate intervention beyond what was provided in the ED, and I believe patient is safe for discharge.  Regardless, an unremarkable evaluation in the ED does not preclude the development or presence of a serious of life threatening condition. As such, patient was instructed to return immediately for any worsening or change in current  symptoms.      ED Medication(s):  Medications   morphine injection 4 mg (4 mg Intravenous Given 9/22/19 0856)   ondansetron injection 4 mg (4 mg Intravenous Given 9/22/19 0856)          Medication List      START taking these medications    famotidine 20 MG tablet  Commonly known as:  PEPCID  Take 1 tablet (20 mg total) by mouth every 12 (twelve) hours. for 60 doses     ibuprofen 800 MG tablet  Commonly known as:  ADVIL,MOTRIN  Take 1 tablet (800 mg total) by mouth every 8 (eight) hours as needed for Pain.        STOP taking these medications    albuterol 90 mcg/actuation inhaler  Commonly known as:  PROVENTIL/VENTOLIN HFA        ASK your doctor about these medications    gabapentin 600 MG tablet  Commonly known as:  NEURONTIN     oxyCODONE 30 MG Tab  Commonly known as:  ROXICODONE     oxyCODONE-acetaminophen  mg per tablet  Commonly known as:  PERCOCET     tiZANidine 4 MG tablet  Commonly known as:  ZANAFLEX     traMADol 50 mg tablet  Commonly known as:  ULTRAM           Where to Get Your Medications      You can get these medications from any pharmacy    Bring a paper prescription for each of these medications  · famotidine 20 MG tablet  · ibuprofen 800 MG tablet         Follow-up Information     Aurelio Dockery DO In 2 days.    Specialty:  Family Medicine  Why:  Return to emergency department for:  Numbness or weakness to 1 side, slurred speech, difficulty breathing, nausea, vomiting, difficulty swallowing, vision loss, or other concerns.  Contact information:  [address] [address] [address] [phone]             [facility] - Cardiology In 2 days.    Specialty:  Cardiology  Why:  For outpatient stress test  Contact information:  [address] [address] [address] [phone]           [facility]rology In 2 days.    Specialty:  Neurology  Why:  For findings on brain CT-old infarcts.  Contact information:  [address] [facility]  [address]  [phone]  Additional information:  1st Floor - From [address], take the [facility] exit ([address]). Enter the facility from the [address]                      MIPS Measures     Smoker? Yes     Hypertension: Pre-hypertension/Hypertension: The pt has been informed that they may have pre-hypertension or hypertension based on a blood pressure reading in the ED. I recommend that the pt call the PCP listed on their discharge instructions or a physician of their choice this week to arrange f/u for further evaluation of possible pre-hypertension or hypertension.        Medical Decision Making     Medical Decision Making:   Initial Assessment:   Patient is a 44-year-old smoker with prior history left upper extremity injury pain. Patient states that he woke with pain to his left and that migrated to his left or elbow than to his left shoulder with a pulling sensation.  Was associated with isolated numbness of the small finger, ring finger, and middle finger.  The 1st and 2nd digit did not have any numbness, the palm did not have any numbness, the forearm or arm did not have any numbness.  There is no new neurologic symptoms such as dysarthria, difficulty swallowing, vision loss, or weakness.  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  Patient also had been off of his or oxycodone 30 mg as he is trying to pass a workman's physical.  Differential Diagnosis:   Muscle strain, atypical ACS, tendonitis, TIA  Clinical Tests:   Lab Tests: Ordered and Reviewed  Radiological Study: Ordered and Reviewed  Medical Tests: Ordered and Reviewed  ED Management:  Patient was placed on monitor.  EKG obtained-normal sinus rhythm. Patient underwent heart score pathway and deemed low risk.  Mode CT was obtained no acute CVA.  Although CT scan did show prior infarcts which were old-patient's symptomatology today do not seem consistent with TIA.  Patient has had prior traumatic brain injury,  question whether these infarcts could be secondary to prior traumatic brain injury. No arrhythmia was noted on the monitor.  Patient also had been off of his usual dose of oxycodone 30 mg further over a week.  Patient's main complaint was pain to the left upper extremity not numbness.  Patient was given IV analgesics with improvement of his symptomatology.  There is no decline in his neurologic function. Patient remained GCS 15, cranial nerves 2-12 intact, finger-to-nose intact. Patient was able ambulate out of the emergency room without difficulty.       Additional MDM:   Smoking Cessation: The patient is a smoker. The patient was counseled on smoking cessation for: 3 minutes. The patient was counseled on tobacco related  health complications. Appropriate patient literature was given to the patient concerning tobacco cessation.   Heart Score:    History:          Moderately suspicious.  ECG:             Normal  Age:               Less than 45 years  Risk factors: 1-2 risk factors  Troponin:       Less than or equal to normal limit  Final Score: 2      JACOB Score:   Age over 65:                                    0.00   > or = to 3 CAD risk factors:           0.00  Established CAD:                            0.00  > or = to 2 anginal events in the past 24 hours: 0.00  Use of ASA in past 7 days:              0.00  Elevated Enzymes:                         0.00  ST Depression > or = to 0.05 mV:  0.00  JACOB score: 0    NIH Stroke Scale:   Interval = baseline (upon arrival/admit)  Level of consciousness = 0 - alert  LOC questions = 0 - answers both correctly  LOC commands = 0 - performs both correctly  Best gaze = 0 - normal  Visual = 0 - no visual loss  Facial palsy = 3 - complete (left sided (OLD - from previous injury))  Motor left arm =  0 - no drift  Motor right arm =  0 - no drift  Motor left leg = 0 - no drift  Motor right leg =  0 - no drift  Limb ataxia = 0 - absent  Sensory = 0 - normal  Best language = 0 -  "no aphasia  Dysarthria = 0 - normal articulation  Extinction and inattention = 0 - no neglect  NIH Stroke Scale Total = 3     This is old, secondary to trauma.  Not new.     MDM  Reviewed: vitals and nursing note          Portions of this note may have been created with voice recognition software. Occasional "wrong-word" or "sound-a-like" substitutions may have occurred due to the inherent limitations of voice recognition software. Please, read the note carefully and recognize, using context, where substitutions have occurred.        Clinical Impression       ICD-10-CM ICD-9-CM   1. Tobacco abuse Z72.0 305.1   2. Chest pain R07.9 786.50   3. Numbness of fingers R20.0 782.0   4. Elevated blood pressure reading R03.0 796.2   5. Dyspepsia R10.13 536.8            Disposition: Discharge to home  Patient condition: Stable           Tammy Hernandez, DO  09/22/19 1504    "

## 2019-09-22 NOTE — ED NOTES
Patient complains of left arm pain. Reports onset of symptoms this am at 5am    Level of Consciousness: Patient is awake, alert, and oriented to person, place, time, and situation. Speech is clear.   HEENT: Symmetrical face, PERRLA, moist mucous membranes, no congestion/drainage, no JVD, pt able to swallow, pt unable to close/move facial area near left eye d/t previous trauma   Appearance: Patient resting comfortably in bed, hygiene and clothing are both intact and appropriate.   Skin: Skin is warm, dry, and intact. Skin is of normal color, free of any skin breakdown. Mucus membranes pink and moist. Scar to left chest   Musculoskeletal: Moves all extremities well. Full active ROM. No deformities noted. reports weakness/burning/numbness to left arm, FROM noted but L  < R . Gait steady, patient ambulates independently, without assistive device.   Respiratory: Airway open and patent. Respirations equal and unlabored. Lung sounds clear upon auscultation. Patient denies any SOB.   Cardiac: Regular rate and rhythm. No peripheral edema noted to bilateral lower extremities. Denies any chest pain or discomfort.   GI: Abdomen soft, non-tender. Bowel sounds present and active in all quadrants x 4. No distention noted. Denies any nausea or vomiting.   : Denies any problem with urination. Denies any problem with bowel movement.   Neurological: Normal sensation reported to all extremities. Symmetrical expressions noted to face. No obvious neurological deficits noted.   Psychosocial: Patient appears anxious, appropriate to situation. Family is involved in patient care.     Patient informed of plan of care, verbalizes understanding, and has no questions or concern at this time. Bed is in the lowest position and locked. Call bell within reach of the patient. Will continue to monitor.

## 2020-04-24 ENCOUNTER — HOSPITAL ENCOUNTER (EMERGENCY)
Facility: HOSPITAL | Age: 45
Discharge: HOME | End: 2020-04-24
Attending: EMERGENCY MEDICINE
Payer: COMMERCIAL

## 2020-04-24 VITALS
SYSTOLIC BLOOD PRESSURE: 165 MMHG | OXYGEN SATURATION: 98 % | RESPIRATION RATE: 20 BRPM | WEIGHT: 174.19 LBS | HEIGHT: 67 IN | DIASTOLIC BLOOD PRESSURE: 102 MMHG | TEMPERATURE: 98 F | HEART RATE: 74 BPM | BODY MASS INDEX: 27.34 KG/M2

## 2020-04-24 DIAGNOSIS — R11.2 NON-INTRACTABLE VOMITING WITH NAUSEA, UNSPECIFIED VOMITING TYPE: Primary | ICD-10-CM

## 2020-04-24 PROCEDURE — 99282 EMERGENCY DEPT VISIT SF MDM: CPT | Mod: ER

## 2020-04-24 NOTE — ED PROVIDER NOTES
Encounter Date: 4/24/2020       History     Chief Complaint   Patient presents with    Vomiting     One episode of nausea and vomiting this am.     44 y/o M with PMH of chronic pain here in the ED to be evaluated for COVID-19. Patient was sent by his work out of concerns. He had 1 episode of nausea and vomiting last night. Currently able to tolerate PO, and feels well. Patient says currently he is having no fever, chills, SOB, cough, congestion, sore throat, myalgias, anosmia, dysgeusia, abdominal pain, dysuria, headache, rashes.         Review of patient's allergies indicates:  No Known Allergies  Past Medical History:   Diagnosis Date    Chronic pain     associated with injury     Past Surgical History:   Procedure Laterality Date    BRAIN SURGERY      EYE SURGERY      SHOULDER SURGERY      post trauma fell 19 feet     Family History   Problem Relation Age of Onset    Peripheral vascular disease Mother     Hypertension Mother     Cancer Father      Social History     Tobacco Use    Smoking status: Current Every Day Smoker     Packs/day: 2.00     Types: Cigarettes    Smokeless tobacco: Never Used   Substance Use Topics    Alcohol use: No     Frequency: Never    Drug use: No     Review of Systems   Constitutional: Negative for chills and fever.   HENT: Negative for congestion and dental problem.    Eyes: Negative for pain and visual disturbance.   Respiratory: Negative for cough and shortness of breath.    Cardiovascular: Negative for chest pain and palpitations.   Gastrointestinal: Positive for nausea and vomiting. Negative for abdominal pain and diarrhea.   Genitourinary: Negative for dysuria and flank pain.   Musculoskeletal: Negative for back pain and neck pain.   Skin: Negative for rash and wound.   Neurological: Negative for weakness, numbness and headaches.       Physical Exam     Initial Vitals [04/24/20 1100]   BP Pulse Resp Temp SpO2   (!) 165/102 74 20 98.2 °F (36.8 °C) 98 %      MAP       --          Physical Exam    Constitutional: He appears well-developed and well-nourished. He is not diaphoretic. No distress.   HENT:   Head: Normocephalic and atraumatic.   Eyes: EOM are normal. Pupils are equal, round, and reactive to light.   Neck: Normal range of motion. Neck supple.   Cardiovascular: Normal rate and regular rhythm.   Pulmonary/Chest: Breath sounds normal. No respiratory distress.   Abdominal: He exhibits no distension. There is no tenderness.   Musculoskeletal: Normal range of motion. He exhibits no tenderness.   Neurological: He is alert and oriented to person, place, and time.   Skin: Skin is warm and dry.   Psychiatric: He has a normal mood and affect.         ED Course   Procedures  Labs Reviewed - No data to display       Imaging Results    None                            ED Course as of Apr 24 1116 Fri Apr 24, 2020   1115 Grossly well appearing, and has no symptoms at this time. Will d/c without testing for COVID.     11:15 AM Reassessment: I reassessed the pt.  The pt is resting comfortably and is NAD.  Pt states their sx have improved. I shared treatment plan, specific conditions for return, and the need for f/u. I  Answered their questions at this time.  Pt understands and agrees to the plan.  The pt has remained hemodynamically stable through ED course and is stable for discharge.       [BA]      ED Course User Index  [BA] Aurelio Espoisto MD                Clinical Impression:       ICD-10-CM ICD-9-CM   1. Non-intractable vomiting with nausea, unspecified vomiting type R11.2 787.01             ED Disposition Condition    Discharge Stable        ED Prescriptions     None        Follow-up Information     Follow up With Specialties Details Why Contact Info    Aurelio Dockery DO Family Medicine Call in 1 day As needed, For re-evaluation and further treatment [id number] [address] [address]  [phone]      [facility] Emergency Medicine Go today If symptoms worsen,  For re-evaluation and further treatment, As needed [address]  [facility] [id number] [phone]                                     Aurelio Esposito MD  04/24/20 5535

## 2020-08-13 ENCOUNTER — HOSPITAL ENCOUNTER (EMERGENCY)
Facility: HOSPITAL | Age: 45
Discharge: HOME | End: 2020-08-13
Attending: EMERGENCY MEDICINE
Payer: COMMERCIAL

## 2020-08-13 VITALS
TEMPERATURE: 97 F | WEIGHT: 180 LBS | RESPIRATION RATE: 19 BRPM | OXYGEN SATURATION: 97 % | SYSTOLIC BLOOD PRESSURE: 140 MMHG | BODY MASS INDEX: 28.19 KG/M2 | DIASTOLIC BLOOD PRESSURE: 90 MMHG | HEART RATE: 74 BPM

## 2020-08-13 DIAGNOSIS — T40.601A OPIATE OVERDOSE, ACCIDENTAL OR UNINTENTIONAL, INITIAL ENCOUNTER: Primary | ICD-10-CM

## 2020-08-13 LAB — HIV 1+2 AB+HIV1 P24 AG SERPL QL IA: NEGATIVE

## 2020-08-13 PROCEDURE — 96366 THER/PROPH/DIAG IV INF ADDON: CPT | Mod: ER

## 2020-08-13 PROCEDURE — 99284 EMERGENCY DEPT VISIT MOD MDM: CPT | Mod: 25,ER

## 2020-08-13 PROCEDURE — 86703 HIV-1/HIV-2 1 RESULT ANTBDY: CPT

## 2020-08-13 PROCEDURE — 25000003 PHARM REV CODE 250: Mod: ER | Performed by: EMERGENCY MEDICINE

## 2020-08-13 PROCEDURE — 63600175 PHARM REV CODE 636 W HCPCS: Mod: ER | Performed by: EMERGENCY MEDICINE

## 2020-08-13 PROCEDURE — 96365 THER/PROPH/DIAG IV INF INIT: CPT | Mod: ER

## 2020-08-13 RX ORDER — NALOXONE HYDROCHLORIDE 4 MG/.1ML
SPRAY NASAL
Qty: 1 EACH | Refills: 11 | Status: SHIPPED | OUTPATIENT
Start: 2020-08-13

## 2020-08-13 RX ADMIN — PROMETHAZINE HYDROCHLORIDE 12.5 MG: 25 INJECTION INTRAMUSCULAR; INTRAVENOUS at 11:08

## 2020-08-13 NOTE — ED PROVIDER NOTES
"Encounter Date: 8/13/2020       History     Chief Complaint   Patient presents with    Drug Overdose     "line of coke" pta, Nasal Narcan awoke pt in car on side of road per RAKESH, Versed given for being combatitive      Patient was found unresponsive in their car, and was given intranasal narcan and is now awake alert and oriented.    The history is provided by the patient and the EMS personnel.   Drug Overdose  This is a chronic problem. The current episode started less than 1 hour ago. The problem occurs every several days. The problem has been resolved. Pertinent negatives include no chest pain, no abdominal pain, no headaches and no shortness of breath. Exacerbated by: Opiates. Relieved by: Narcan.     Review of patient's allergies indicates:  No Known Allergies  Past Medical History:   Diagnosis Date    Chronic pain     associated with injury     Past Surgical History:   Procedure Laterality Date    BRAIN SURGERY      EYE SURGERY      SHOULDER SURGERY      post trauma fell 19 feet     Family History   Problem Relation Age of Onset    Peripheral vascular disease Mother     Hypertension Mother     Cancer Father      Social History     Tobacco Use    Smoking status: Current Every Day Smoker     Packs/day: 2.00     Types: Cigarettes    Smokeless tobacco: Never Used   Substance Use Topics    Alcohol use: No     Frequency: Never    Drug use: Yes     Review of Systems   Constitutional: Negative for fever.   HENT: Negative for sore throat.    Respiratory: Negative for shortness of breath.    Cardiovascular: Negative for chest pain.   Gastrointestinal: Negative for abdominal pain and nausea.   Genitourinary: Negative for dysuria.   Musculoskeletal: Negative for back pain.   Skin: Negative for rash.   Neurological: Negative for weakness and headaches.   Hematological: Does not bruise/bleed easily.       Physical Exam     Initial Vitals [08/13/20 0951]   BP Pulse Resp Temp SpO2   109/72 90 14 97.2 °F (36.2 °C) " (!) 92 %      MAP       --         Physical Exam    Nursing note and vitals reviewed.  Constitutional: He appears well-developed and well-nourished. No distress.   HENT:   Head: Normocephalic and atraumatic.   Mouth/Throat: Oropharynx is clear and moist.   Eyes: Conjunctivae and EOM are normal. Pupils are equal, round, and reactive to light.   Neck: Normal range of motion.   Cardiovascular: Normal rate. Exam reveals no gallop and no friction rub.    No murmur heard.  Pulmonary/Chest: No respiratory distress.   Abdominal: He exhibits no distension.   Musculoskeletal: Normal range of motion. No edema.   Neurological: He is alert and oriented to person, place, and time. No cranial nerve deficit.   Skin: Skin is warm and dry. No rash noted.   Psychiatric: He has a normal mood and affect. Thought content normal.         ED Course   Procedures  Labs Reviewed   HIV 1 / 2 ANTIBODY          Imaging Results    None                       ED Vital Signs:  Vitals:    08/13/20 0951 08/13/20 0957 08/13/20 1050 08/13/20 1230   BP: 109/72  113/60 137/84   Pulse: 90 89 89    Resp: 14  14    Temp: 97.2 °F (36.2 °C)      TempSrc: Oral      SpO2: (!) 92% (!) 94% 97%    Weight: 81.6 kg (180 lb)       08/13/20 1310   BP: (!) 140/90   Pulse: 74   Resp: 19   Temp:    TempSrc:    SpO2: 97%   Weight:          Abnormal Lab Results:  Labs Reviewed   HIV 1 / 2 ANTIBODY          All Lab Results:      Imaging Results:  Imaging Results    None            The Emergency Provider reviewed the vital signs and test results, which are outlined above.    ED Discussions:  1:46 PM: Reassessed pt at this time.  Pt states his condition has improved at this time. Discussed with pt all pertinent ED information and results. Discussed pt dx of opiate overdose and plan of tx. Gave pt all f/u and return to the ED instructions. All questions and concerns were addressed at this time. Pt expresses understanding of information and instructions, and is comfortable with  plan to discharge. Pt is stable for discharge.                                Clinical Impression:       ICD-10-CM ICD-9-CM   1. Opiate overdose, accidental or unintentional, initial encounter  T40.601A 965.00     E850.2           Disposition:   Disposition: Discharged  Condition: Stable     ED Disposition Condition    Discharge Stable        ED Prescriptions     Medication Sig Dispense Start Date End Date Auth. Provider    naloxone (NARCAN) 4 mg/actuation Spry 4mg by nasal route as needed for opioid overdose; may repeat every 2-3 minutes in alternating nostrils until medical help arrives. Call 911 1 each 8/13/2020  Dain Lopes MD        Follow-up Information     Follow up With Specialties Details Why Contact Info    Aurelio Dockery, DO [address] [address]  [phone]                                       Dain Lopes MD  08/13/20 7045

## 2020-08-13 NOTE — ED NOTES
Patient alert but disoriented to place and situation.   Patient identifiers verified and correct for Toño Nelson. Patient has no complaints at this time.     LOC: The patient is awake, alert and aware of environment with an appropriate affect, the patient is disoriented to situation and place and speaking appropriately.  APPEARANCE: Patient resting comfortably and in no acute distress, patient is clean and well groomed, patient's clothing is properly fastened.  SKIN: The skin is warm and dry, color consistent with ethnicity, patient has normal skin turgor and moist mucus membranes, skin intact, no breakdown or bruising noted.  MUSCULOSKELETAL: Patient moving all extremities spontaneously.  RESPIRATORY: Airway is open and patent, respirations are spontaneous.  CARDIAC: Patient has a normal rate, no periphreal edema noted, capillary refill < 3 seconds.  ABDOMEN: Soft and non tender to palpation.

## 2020-08-13 NOTE — ED NOTES
Pt in NAD, VSS, Resp e/u. AAO x3.  Stretcher locked in lowest position. Side rails up x2. Call bell within reach. Will continue to monitor.

## 2020-09-13 ENCOUNTER — HOSPITAL ENCOUNTER (EMERGENCY)
Facility: HOSPITAL | Age: 45
Discharge: HOME | End: 2020-09-13
Attending: EMERGENCY MEDICINE
Payer: COMMERCIAL

## 2020-09-13 VITALS
HEIGHT: 67 IN | OXYGEN SATURATION: 97 % | DIASTOLIC BLOOD PRESSURE: 88 MMHG | SYSTOLIC BLOOD PRESSURE: 130 MMHG | WEIGHT: 171.94 LBS | HEART RATE: 87 BPM | TEMPERATURE: 99 F | RESPIRATION RATE: 20 BRPM | BODY MASS INDEX: 26.99 KG/M2

## 2020-09-13 DIAGNOSIS — R03.0 ELEVATED BLOOD PRESSURE READING: ICD-10-CM

## 2020-09-13 DIAGNOSIS — Z71.89 ADVICE GIVEN ABOUT COVID-19 VIRUS INFECTION: ICD-10-CM

## 2020-09-13 DIAGNOSIS — R52 GENERALIZED BODY ACHES: ICD-10-CM

## 2020-09-13 DIAGNOSIS — Z20.822 EXPOSURE TO COVID-19 VIRUS: Primary | ICD-10-CM

## 2020-09-13 PROCEDURE — 99282 EMERGENCY DEPT VISIT SF MDM: CPT | Mod: ER

## 2020-09-13 PROCEDURE — U0003 INFECTIOUS AGENT DETECTION BY NUCLEIC ACID (DNA OR RNA); SEVERE ACUTE RESPIRATORY SYNDROME CORONAVIRUS 2 (SARS-COV-2) (CORONAVIRUS DISEASE [COVID-19]), AMPLIFIED PROBE TECHNIQUE, MAKING USE OF HIGH THROUGHPUT TECHNOLOGIES AS DESCRIBED BY CMS-2020-01-R: HCPCS

## 2020-09-13 NOTE — Clinical Note
"Toño "Binta Nelson was seen and treated in our emergency department on 9/13/2020.     COVID-19 is present in our communities across the state. There is limited testing for COVID at this time, so not all patients can be tested. In this situation, your employee meets the following criteria:    Toño Nelson has met the criteria for COVID-19 testing based upon symptoms, travel, and/or potential exposure. The test has been completed and is pending results at this time. During this time the employee is not able to work and should be quarantined per the Centers for Disease Control timelines.     If you have any questions or concerns, or if I can be of further assistance, please do not hesitate to contact me.    Sincerely,             Rosibel Little PA-C"

## 2020-09-13 NOTE — ED PROVIDER NOTES
HISTORY     Chief Complaint   Patient presents with    COVID-19 Concerns         Review of patient's allergies indicates:  No Known Allergies     HPI   HPI     Pt reports being exposed to Covid-19. Pt reports the following symptoms: generalized body aches x 2 days. Pt denies any of the following: CP, SOB, fever, NVD, abdominal pain or any other symptoms at this time.     PCP: Aurelio Dockery DO     Past Medical History:  Past Medical History:   Diagnosis Date    Chronic pain     associated with injury        Past Surgical History:  Past Surgical History:   Procedure Laterality Date    BRAIN SURGERY      EYE SURGERY      SHOULDER SURGERY      post trauma fell 19 feet        Family History:  Family History   Problem Relation Age of Onset    Peripheral vascular disease Mother     Hypertension Mother     Cancer Father         Social History:  Social History     Tobacco Use    Smoking status: Current Every Day Smoker     Packs/day: 2.00     Types: Cigarettes    Smokeless tobacco: Never Used   Substance and Sexual Activity    Alcohol use: No     Frequency: Never    Drug use: Yes    Sexual activity: Not on file         ROS   Review of Systems   Constitutional: Negative for chills and fever.   HENT: Negative for congestion and rhinorrhea.    Eyes: Negative for discharge and redness.   Respiratory: Negative for cough and wheezing.    Cardiovascular: Negative for chest pain and palpitations.   Gastrointestinal: Negative for diarrhea, nausea and vomiting.   Musculoskeletal: Positive for arthralgias and myalgias.   Skin: Negative for rash and wound.   Neurological: Negative for dizziness and headaches.       PHYSICAL EXAM     Initial Vitals [09/13/20 1314]   BP Pulse Resp Temp SpO2   130/88 87 20 98.8 °F (37.1 °C) 97 %      MAP       --           Physical Exam     Nursing Notes and Vital Signs Reviewed.  Constitutional: Patient is in no acute distress.   Pulmonary/Chest: No respiratory distress.  "  Musculoskeletal: Moves all extremities.   Neurological:  Alert, awake, and appropriate.  Normal speech.    Psychiatric: Normal affect. Good eye contact. Appropriate in content.      ED COURSE   Procedures  ED ONGOING VITALS:  Vitals:    09/13/20 1314   BP: 130/88   Pulse: 87   Resp: 20   Temp: 98.8 °F (37.1 °C)   TempSrc: Oral   SpO2: 97%   Weight: 78 kg (171 lb 15.3 oz)   Height: 5' 7" (1.702 m)         ABNORMAL LAB VALUES:  Labs Reviewed   SARS-COV-2 (COVID-19) QUALITATIVE PCR         ALL LAB VALUES:  In process      RADIOLOGY STUDIES:  Imaging Results    None                   The above vital signs and test results have been reviewed by the emergency provider.     ED Medications:  Current Discharge Medication List        Discharge Medications:  Current Discharge Medication List         Follow-up Information     Aurelio Dockery DO. Schedule an appointment as soon as possible for a visit in 3 days.    Specialty: Family Medicine  Contact information:  [address] [address] [address]  [phone]             [facility].    Specialty: Emergency Medicine  Why: If symptoms worsen  Contact information:  [address] [address] [address] [phone]             1:18 PM: Discussed with pt all pertinent ED information and results. Discussed pt dx and plan of tx. Gave pt all f/u and return to the ED instructions. All questions and concerns were addressed at this time. Pt expresses understanding of information and instructions, and is comfortable with plan to discharge. Pt is stable for discharge.    Instructions for Patients with Confirmed or Suspected COVID-19    If you are awaiting your test result, you will either be called or it will be released to the patient portal.  If you have any questions about your test, please visit [email or web address] or call our COVID-19 information line at [phone].      Instructions for non-hospitalized or discharged patients with " confirmed or suspected COVID-19:       Stay home except to get medical care.    Separate yourself from other people and animals in your home.    Call ahead before visiting your doctor.    Wear a face mask.    Cover your coughs and sneezes.    Clean your hands often.    Avoid sharing personal household items.    Clean all high-touch surfaces every day.    Monitor your symptoms. Seek prompt medical attention if your illness is worsening (e.g., difficulty breathing). Before seeking care, call your healthcare provider.    If you have a medical emergency and must call 911, notify the dispatcher that you have or are being evaluated for COVID-19. If possible, put on a face mask before emergency medical services arrive.    Use the following symptom-based strategy to return to normal activity following a suspected or confirmed case of COVID-19. Continue isolation until:   o At least 3 days (72 hours) have passed since recovery defined as resolution of fever without the use of fever-reducing medications and improvement in respiratory symptoms (e.g. cough, shortness of breath), and   o At least 10 days have passed since the first positive test.       As one of the next steps, you will receive a call or text from the [place] Department of Health ([facility]) COVID-19 Tracing Team. See the contact information below so you know not to ignore the health departments call. It is important that you contact them back immediately so they can help.     Contact Tracer Number:  [phone]  Caller ID for most carriers: [facility]    What is contact tracing?   Contact tracing is a process that helps identify everyone who has been in close contact with an infected person. Contact tracers let those people know they may have been exposed and guide them on next steps. Confidentiality is important for everyone; no one will be told who may have exposed them to the virus.   Your involvement is important. The more we know about  where and how this virus is spreading, the better chance we have at stopping it from spreading further.  What does exposure mean?   Exposure means you have been within 6 feet for more than 15 minutes with a person who has or had COVID-19.  What kind of questions do the contact tracers ask?   A contact tracer will confirm your basic contact information including name, address, phone number, and next of kin, as well as asking about any symptoms you may have had. Theyll also ask you how you think you may have gotten sick, such as places where you may have been exposed to the virus, and people you were with. Those names will never be shared with anyone outside of that call, and will only be used to help trace and stop the spread of the virus.   I have privacy concerns. How will the state use my information?   Your privacy about your health is important. All calls are completed using call centers that use the appropriate health privacy protection measures (HIPAA compliance), meaning that your patient information is safe. No one will ever ask you any questions related to immigration status. Your health comes first.   Do I have to participate?   You do not have to participate, but we strongly encourage you to. Contact tracing can help us catch and control new outbreaks as theyre developing to keep your friends and family safe.   What if I dont hear from anyone?   If you dont receive a call within 24 hours, you can call the number above right away to inquire about your status. That line is open from 8:00 am - 8:00 p.m., 7 days a week.  Contact tracing saves lives! Together, we have the power to beat this virus and keep our loved ones and neighbors safe.       Instructions for household members, intimate partners and caregivers in a non-healthcare setting of a patient with confirmed or suspected COVID-19:         Close contacts should monitor their health and call their healthcare provider right away if they  develop symptoms suggestive of COVID-19 (e.g., fever, cough, shortness of breath).    Stay home except to get medical care. Separate yourself from other people and animals in the home.   Monitor the patients symptoms. If the patient is getting sicker, call his or her healthcare provider. If the patient has a medical emergency and you need to call 911, notify the dispatch personnel that the patient has or is being evaluated for COVID-19.    Wear a facemask when around other people such as sharing a room or vehicle and before entering a healthcare provider's office.   Cover coughs and sneezes with a tissue. Throw used tissues in a lined trash can immediately and wash hands.   Clean hands often with soap and water for at least 20 seconds or with an alcohol-based hand , rubbing hands together until they feel dry. Avoid touching your eyes, nose, and mouth with unwashed hands.   Clean all high-touch; surfaces every day, including counters, tabletops, doorknobs, bathroom fixtures, toilets, phones, keyboards, tablets, bedside tables, etc. Use a household cleaning spray or wipe according to label instructions.   Avoid sharing personal household items such as dishes, drinking glasses, cups, towels, bedding, etc. After these items are used, they should be washed thoroughly with soap and water.   Continue isolation until:   At least 3 days (72 hours) have passed since recovery defined as resolution of fever without the use of fever-reducing medications and improvement in respiratory symptoms (e.g. cough, shortness of breath), and    At least 10 days have passed since the patients first positive test.    https://www.cdc.gov/coronavirus/2019-ncov/your-health/index.htm        I discussed with patient and/or family/caretaker that evaluation in the ED does not suggest any emergent or life threatening medical conditions requiring immediate intervention beyond what was provided in the ED, and I believe patient  is safe for discharge. Regardless, an unremarkable evaluation in the ED does not preclude the development or presence of a serious or life threatening condition. As such, patient was instructed to return immediately for any worsening or change in current symptoms.        MEDICAL DECISION MAKING                 CLINICAL IMPRESSION       ICD-10-CM ICD-9-CM   1. Exposure to Covid-19 Virus  Z20.828    2. Advice Given About Covid-19 Virus Infection  Z71.89    3. Elevated blood pressure reading  R03.0 796.2   4. Generalized body aches  R52 780.96       Disposition:   Disposition: Discharged  Condition: Stable         Rosibel Little PA-C  09/13/20 1402

## 2020-09-14 LAB — SARS-COV-2 RNA RESP QL NAA+PROBE: NOT DETECTED

## 2021-04-29 ENCOUNTER — PATIENT MESSAGE (OUTPATIENT)
Dept: RESEARCH | Facility: HOSPITAL | Age: 46
End: 2021-04-29

## 2023-04-26 NOTE — ED NOTES
Pt stable, in NAD, and states no further needs at this time. MD aware of vitals. Pt to be d/c'd home.   Inflammation Suggestive Of Cancer Camouflage Histology Text: There was a dense lymphocytic infiltrate which prevented adequate histologic evaluation of adjacent structures.

## 2024-01-03 ENCOUNTER — OFFICE VISIT (OUTPATIENT)
Dept: INTERNAL MEDICINE | Facility: CLINIC | Age: 49
End: 2024-01-03
Payer: COMMERCIAL

## 2024-01-03 VITALS
OXYGEN SATURATION: 98 % | TEMPERATURE: 97 F | WEIGHT: 200.19 LBS | HEIGHT: 67 IN | BODY MASS INDEX: 31.42 KG/M2 | HEART RATE: 85 BPM | DIASTOLIC BLOOD PRESSURE: 98 MMHG | SYSTOLIC BLOOD PRESSURE: 142 MMHG

## 2024-01-03 DIAGNOSIS — Z76.89 ENCOUNTER TO ESTABLISH CARE: ICD-10-CM

## 2024-01-03 DIAGNOSIS — F19.11 H/O DRUG ABUSE: ICD-10-CM

## 2024-01-03 DIAGNOSIS — G56.93 NEUROPATHY OF BOTH UPPER EXTREMITIES: ICD-10-CM

## 2024-01-03 DIAGNOSIS — G51.0 7TH NERVE PALSY: Primary | ICD-10-CM

## 2024-01-03 DIAGNOSIS — I10 BENIGN ESSENTIAL HTN: ICD-10-CM

## 2024-01-03 DIAGNOSIS — Z72.0 TOBACCO USE: ICD-10-CM

## 2024-01-03 DIAGNOSIS — Z12.11 ENCOUNTER FOR SCREENING COLONOSCOPY: ICD-10-CM

## 2024-01-03 DIAGNOSIS — I63.89 OTHER CEREBRAL INFARCTION: ICD-10-CM

## 2024-01-03 PROCEDURE — 3080F DIAST BP >= 90 MM HG: CPT | Mod: CPTII,S$GLB,, | Performed by: STUDENT IN AN ORGANIZED HEALTH CARE EDUCATION/TRAINING PROGRAM

## 2024-01-03 PROCEDURE — 3008F BODY MASS INDEX DOCD: CPT | Mod: CPTII,S$GLB,, | Performed by: STUDENT IN AN ORGANIZED HEALTH CARE EDUCATION/TRAINING PROGRAM

## 2024-01-03 PROCEDURE — 3077F SYST BP >= 140 MM HG: CPT | Mod: CPTII,S$GLB,, | Performed by: STUDENT IN AN ORGANIZED HEALTH CARE EDUCATION/TRAINING PROGRAM

## 2024-01-03 PROCEDURE — 99999 PR PBB SHADOW E&M-EST. PATIENT-LVL V: CPT | Mod: PBBFAC,,, | Performed by: STUDENT IN AN ORGANIZED HEALTH CARE EDUCATION/TRAINING PROGRAM

## 2024-01-03 PROCEDURE — 99204 OFFICE O/P NEW MOD 45 MIN: CPT | Mod: S$GLB,,, | Performed by: STUDENT IN AN ORGANIZED HEALTH CARE EDUCATION/TRAINING PROGRAM

## 2024-01-03 PROCEDURE — 1159F MED LIST DOCD IN RCRD: CPT | Mod: CPTII,S$GLB,, | Performed by: STUDENT IN AN ORGANIZED HEALTH CARE EDUCATION/TRAINING PROGRAM

## 2024-01-03 PROCEDURE — 4010F ACE/ARB THERAPY RXD/TAKEN: CPT | Mod: CPTII,S$GLB,, | Performed by: STUDENT IN AN ORGANIZED HEALTH CARE EDUCATION/TRAINING PROGRAM

## 2024-01-03 RX ORDER — LOSARTAN POTASSIUM 25 MG/1
25 TABLET ORAL DAILY
Qty: 30 TABLET | Refills: 0 | Status: SHIPPED | OUTPATIENT
Start: 2024-01-03 | End: 2024-02-02

## 2024-01-03 NOTE — LETTER
January 3, 2024    Toño Nelson  [address]  [place] [address]             [facility] Internal Medicine  Internal Medicine  [address]  [place] [address]  Phone: [phone]  Fax: [phone]   January 3, 2024     Patient: Toño Nelson   YOB: 1975   Date of Visit: 1/3/2024       To Whom it May Concern:    Toño Nelson was seen in my clinic on 1/3/2024. He may return to work on 1/05/2023 .    Please excuse him from any classes or work missed on 01/02/2024    If you have any questions or concerns, please don't hesitate to call.    Sincerely,         Elvis Hein MD

## 2024-01-04 ENCOUNTER — HOSPITAL ENCOUNTER (OUTPATIENT)
Dept: PREADMISSION TESTING | Facility: HOSPITAL | Age: 49
Discharge: HOME | End: 2024-01-04
Attending: INTERNAL MEDICINE
Payer: COMMERCIAL

## 2024-01-04 DIAGNOSIS — Z12.11 ENCOUNTER FOR SCREENING COLONOSCOPY: ICD-10-CM

## 2024-01-04 NOTE — PROGRESS NOTES
"  Chief Complaint   Patient presents with    Numbness     HPI: Toño Nelson is a 48 y.o. male  with Pmhx listed below who presents to clinic for establishing care. He has complex medical history.  He reports to have had fall injury landing on his left side of the face it years back.  He reports that he had crushed his left side of his in his brain completely at that time.  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  He was previously using teardrops but not lately as he reports that he" has a learn to live with it'.  He has not seen ophthalmologist recently.  His other concern today includes numbness in his hands bilaterally along the ulnar nerve distribution.  He reports that it has been going on for about 1 month and it is off and on and it does not really bother him but he is concerned that it might be stroke.  This is the 1st time I have seeing him.  He does not have any significant past medical records other than the ER visits from 2019 to 2020.  Review of the chart reveals that he had a CT scan done on 09/22/2022 which showed Old peripheral infarcts within the temporal lobes and frontal lobes.  This raises the concern for possible prior cardioembolic disease.  He never had any follow-up following that.  He is never seen a cardiologist yet.  He has not had any echo done on that regards yet.  He continues to smoke 1-2 pack of cigarettes per day and has been doing it for over 20 years.  Discussed about smoking cessation but he has no desire to quit.  His past medical history significant having history of drug abuse and he reports that he used to take cocaine and marijuana in the past but he has no longer taking it at present.  Labs to be repeated today.  His blood pressure is elevated and he has not on any medication at present.         With regards to his 7th cranial nerve palsy he was told to follow up with Plastic and " Reconstructive surgery at [facility] by Dr. Alfonzo Francois which he didn't want to do back then.  Problem List:  Patient Active Problem List   Diagnosis    Upper respiratory tract infection    Bronchitis    Fever       ROS: Negative except as noted above.       Current Meds:  Current Outpatient Medications   Medication Sig Dispense Refill    losartan (COZAAR) 25 MG tablet Take 1 tablet (25 mg total) by mouth once daily. 30 tablet 0    naloxone (NARCAN) 4 mg/actuation Spry 4mg by nasal route as needed for opioid overdose; may repeat every 2-3 minutes in alternating nostrils until medical help arrives. Call 911 (Patient not taking: Reported on 1/3/2024) 1 each 11     No current facility-administered medications for this visit.      PE:  BP: (!) 142/98  Pulse: 85     Temp: 97.3 °F (36.3 °C)  Weight: 90.8 kg (200 lb 2.8 oz) Body mass index is 31.35 kg/m².    Wt Readings from Last 5 Encounters:   01/03/24 90.8 kg (200 lb 2.8 oz)   09/13/20 78 kg (171 lb 15.3 oz)   08/13/20 81.6 kg (180 lb)   04/24/20 79 kg (174 lb 2.6 oz)   09/22/19 71.6 kg (157 lb 13.6 oz)     General appearance: alert and cooperative, not in acute distress  Head: normocephalic, without obvious abnormality, atraumatic  Eyes: conjunctivae/corneas clear. PERRL, EOM's intact.  Ears: clear tympanic membranes   Neck: no adenopathy, supple, symmetrical, trachea midline and thyroid not enlarged, symmetric, no tenderness/mass/nodules, no JVD  Throat: lips, mucosa, and tongue normal; teeth and gums normal; no thrush  Chest: no reproducible chest pain   Heart: regular rate and rhythm, S1, S2 normal, no murmur, click, rub or gallop  Lungs: unlabored respiration, bilateral equal air entry, normal vesicular breath sound heard, no wheezing, rhonchi   Abdomen: soft, non-tender, non-distended; bowel sounds +; no masses,  no organomegaly, no ascites   Extremities: normal, atraumatic, no cyanosis or edema noted B/L upper and lower extremities.  Skin: skin  color, texture, turgor normal. No rashes or lesions noted.  Neurologic: grossly intact      Lab:  Lab Results   Component Value Date    WBC 7.42 09/22/2019    HGB 15.9 09/22/2019    HCT 45.0 09/22/2019    MCV 85 09/22/2019     09/22/2019     09/22/2019    K 3.4 (L) 09/22/2019     09/22/2019    CO2 25 09/22/2019    BUN 17 09/22/2019    GLU 85 09/22/2019    CALCIUM 9.6 09/22/2019    MG 1.9 04/13/2019    PHOS 3.6 04/13/2019    AST 8 (L) 09/22/2019    ALT 14 09/22/2019    INR 1.0 04/13/2019       Impression:    ICD-10-CM ICD-9-CM    1. 7th nerve palsy  G51.0 351.0 Ambulatory referral/consult to Ophthalmology      2. Encounter to establish care  Z76.89 V65.8 HEPATITIS C ANTIBODY      LIPID PANEL      HEMOGLOBIN A1C      3. Benign essential HTN  I10 401.1 COMPREHENSIVE METABOLIC PANEL      losartan (COZAAR) 25 MG tablet      4. Other cerebral infarction  I63.89 434.91 MRI Brain Without Contrast      Echo Saline Bubble? Yes      5. H/O drug abuse  F19.11 305.93 Toxicology Screen, Urine      6. Neuropathy of both upper extremities  G56.93 356.9 EMG W/ ULTRASOUND AND NERVE CONDUCTION TEST 2 Extremities      7. Tobacco use  Z72.0 305.1       8. Encounter for screening colonoscopy  Z12.11 V76.51 Ambulatory referral/consult to Endo Procedure         1. Encounter to establish care  - HEPATITIS C ANTIBODY; Future  - LIPID PANEL; Future  - HEMOGLOBIN A1C; Future    2. Benign essential HTN  Low salt diet.  Enouraged to increase in physical activity at least 30 minutes of brisk walking/day , 5 days a week  Advised weight loss    Advised for DASH diet - The Dietary Approaches to Stop Hypertension (DASH) is high in vegetables, fruits, low-fat dairy products, whole grains, poultry, fish, and nuts and low in sweets, sugar-sweetened beverages, and red meats   Stop smoking.  Limit caffeine intake  Limit alcohol intake <3/day for men and <2/day for women.  Advised to be compliant with medication.  Please monitor  your blood pressure regularly at home   Return precaution provided    - COMPREHENSIVE METABOLIC PANEL; Future  - losartan (COZAAR) 25 MG tablet; Take 1 tablet (25 mg total) by mouth once daily.  Dispense: 30 tablet; Refill: 0    3. 7th nerve palsy  LMN lesion  Cannot close eye  Has not seen opthalmology  Rule out keratitis and other corneal lesion  - Ambulatory referral/consult to Ophthalmology; Future    4. Other cerebral infarction  - MRI Brain Without Contrast; Future  - Echo Saline Bubble? Yes; Future    5. H/O drug abuse  - Toxicology Screen, Urine    6. Neuropathy of both upper extremities  - EMG W/ ULTRASOUND AND NERVE CONDUCTION TEST 2 Extremities; Future    7. Tobacco use  Assistance with smoking cessation was offered, including:  [x]  Medications  []  Counseling  [x]  Printed Information on Smoking Cessation  [x]  Referral to a Smoking Cessation Program    Patient was counseled regarding smoking for 3-10 minutes.      8. Encounter for screening colonoscopy  - Ambulatory referral/consult to Endo Procedure ; Future    Future Appointments   Date Time Provider Department Center   1/4/2024  8:30 AM [facility] LABORATORY [facility] [place]   1/4/2024 10:15 AM [facility]   1/4/2024 11:00 AM Humaira Pina MD [facility] PHYS [place]   1/8/2024 11:00 AM IBV MRI1 [facility] MRI [place]   1/8/2024  2:00 PM Tony Lovell, OD [facility] [place] [facility]"" [facility]   1/10/2024 12:15 PM ECHOCARDIOGRAM, [facility] HG SPECCPR [place]   1/17/2024  1:00 PM Elvis Hein MD [facility] IM [place]       Rtc in 2 weeks with results for further discussion of his condition and management.      Elvis Hein MD

## 2024-01-16 ENCOUNTER — TELEPHONE (OUTPATIENT)
Dept: CARDIOLOGY | Facility: HOSPITAL | Age: 49
End: 2024-01-16
Payer: COMMERCIAL

## 2024-01-18 ENCOUNTER — TELEPHONE (OUTPATIENT)
Dept: CARDIOLOGY | Facility: HOSPITAL | Age: 49
End: 2024-01-18
Payer: COMMERCIAL

## 2024-01-22 ENCOUNTER — TELEPHONE (OUTPATIENT)
Dept: NEUROLOGY | Facility: CLINIC | Age: 49
End: 2024-01-22
Payer: COMMERCIAL

## 2024-07-30 ENCOUNTER — HOSPITAL ENCOUNTER (OUTPATIENT)
Dept: CARDIOLOGY | Facility: HOSPITAL | Age: 49
Discharge: HOME | End: 2024-07-30
Attending: NURSE PRACTITIONER
Payer: COMMERCIAL

## 2024-07-30 ENCOUNTER — HOSPITAL ENCOUNTER (OUTPATIENT)
Dept: RADIOLOGY | Facility: HOSPITAL | Age: 49
Discharge: HOME | End: 2024-07-30
Attending: NURSE PRACTITIONER
Payer: COMMERCIAL

## 2024-07-30 DIAGNOSIS — R06.00 DYSPNEA: ICD-10-CM

## 2024-07-30 DIAGNOSIS — R03.0 ELEVATED BLOOD PRESSURE READING WITHOUT DIAGNOSIS OF HYPERTENSION: ICD-10-CM

## 2024-07-30 DIAGNOSIS — E78.5 HYPERLIPEMIA: ICD-10-CM

## 2024-07-30 DIAGNOSIS — E78.5 HYPERLIPEMIA: Primary | ICD-10-CM

## 2024-07-30 DIAGNOSIS — R06.00 DYSPNEA: Primary | ICD-10-CM

## 2024-07-30 DIAGNOSIS — R73.9 BLOOD GLUCOSE ELEVATED: ICD-10-CM

## 2024-07-30 LAB
OHS QRS DURATION: 96 MS
OHS QTC CALCULATION: 457 MS

## 2024-07-30 PROCEDURE — 71046 X-RAY EXAM CHEST 2 VIEWS: CPT | Mod: TC,PO

## 2024-07-30 PROCEDURE — 71046 X-RAY EXAM CHEST 2 VIEWS: CPT | Mod: 26,,, | Performed by: RADIOLOGY

## 2024-07-30 PROCEDURE — 93005 ELECTROCARDIOGRAM TRACING: CPT | Mod: PO

## 2024-07-30 PROCEDURE — 93010 ELECTROCARDIOGRAM REPORT: CPT | Mod: ,,, | Performed by: INTERNAL MEDICINE

## 2024-07-31 ENCOUNTER — HOSPITAL ENCOUNTER (INPATIENT)
Facility: HOSPITAL | Age: 49
LOS: 2 days | Discharge: HOME | DRG: 322 | End: 2024-08-02
Attending: EMERGENCY MEDICINE | Admitting: INTERNAL MEDICINE
Payer: COMMERCIAL

## 2024-07-31 DIAGNOSIS — I20.9 ANGINA PECTORIS: ICD-10-CM

## 2024-07-31 DIAGNOSIS — I21.09 ANTERIOR WALL MYOCARDIAL INFARCTION: Primary | ICD-10-CM

## 2024-07-31 DIAGNOSIS — I21.3 ST ELEVATION MYOCARDIAL INFARCTION (STEMI), UNSPECIFIED ARTERY: ICD-10-CM

## 2024-07-31 DIAGNOSIS — R07.9 CHEST PAIN: ICD-10-CM

## 2024-07-31 PROBLEM — I21.9 MYOCARDIAL INFARCTION: Status: ACTIVE | Noted: 2024-07-31

## 2024-07-31 PROBLEM — Z72.0 TOBACCO ABUSE: Status: ACTIVE | Noted: 2024-07-31

## 2024-07-31 PROBLEM — I10 HYPERTENSION: Status: ACTIVE | Noted: 2024-07-31

## 2024-07-31 PROBLEM — E78.5 HYPERLIPIDEMIA: Status: ACTIVE | Noted: 2024-07-31

## 2024-07-31 PROBLEM — R94.31 ABNORMAL EKG: Status: ACTIVE | Noted: 2024-07-31

## 2024-07-31 LAB
ALBUMIN SERPL BCP-MCNC: 4.3 G/DL (ref 3.5–5.2)
ALP SERPL-CCNC: 119 U/L (ref 55–135)
ALT SERPL W/O P-5'-P-CCNC: 22 U/L (ref 10–44)
ANION GAP SERPL CALC-SCNC: 12 MMOL/L (ref 8–16)
AST SERPL-CCNC: 13 U/L (ref 10–40)
BASOPHILS # BLD AUTO: 0.07 K/UL (ref 0–0.2)
BASOPHILS NFR BLD: 0.9 % (ref 0–1.9)
BILIRUB SERPL-MCNC: 0.9 MG/DL (ref 0.1–1)
BNP SERPL-MCNC: 36 PG/ML (ref 0–99)
BUN SERPL-MCNC: 30 MG/DL (ref 6–20)
CALCIUM SERPL-MCNC: 9.9 MG/DL (ref 8.7–10.5)
CATH EF QUANTITATIVE: 25 %
CHLORIDE SERPL-SCNC: 104 MMOL/L (ref 95–110)
CO2 SERPL-SCNC: 24 MMOL/L (ref 23–29)
CREAT SERPL-MCNC: 1.6 MG/DL (ref 0.5–1.4)
DIFFERENTIAL METHOD BLD: NORMAL
EOSINOPHIL # BLD AUTO: 0.2 K/UL (ref 0–0.5)
EOSINOPHIL NFR BLD: 2.7 % (ref 0–8)
ERYTHROCYTE [DISTWIDTH] IN BLOOD BY AUTOMATED COUNT: 14 % (ref 11.5–14.5)
EST. GFR  (NO RACE VARIABLE): 53 ML/MIN/1.73 M^2
GLUCOSE SERPL-MCNC: 98 MG/DL (ref 70–110)
HCT VFR BLD AUTO: 50.2 % (ref 40–54)
HCV AB SERPL QL IA: NEGATIVE
HEP C VIRUS HOLD SPECIMEN: NORMAL
HGB BLD-MCNC: 17.4 G/DL (ref 14–18)
HIV 1+2 AB+HIV1 P24 AG SERPL QL IA: NEGATIVE
IMM GRANULOCYTES # BLD AUTO: 0.03 K/UL (ref 0–0.04)
IMM GRANULOCYTES NFR BLD AUTO: 0.4 % (ref 0–0.5)
INR PPP: 0.9 (ref 0.8–1.2)
LYMPHOCYTES # BLD AUTO: 2.2 K/UL (ref 1–4.8)
LYMPHOCYTES NFR BLD: 27.2 % (ref 18–48)
MCH RBC QN AUTO: 28.8 PG (ref 27–31)
MCHC RBC AUTO-ENTMCNC: 34.7 G/DL (ref 32–36)
MCV RBC AUTO: 83 FL (ref 82–98)
MONOCYTES # BLD AUTO: 0.7 K/UL (ref 0.3–1)
MONOCYTES NFR BLD: 8.3 % (ref 4–15)
NEUTROPHILS # BLD AUTO: 4.9 K/UL (ref 1.8–7.7)
NEUTROPHILS NFR BLD: 60.5 % (ref 38–73)
NRBC BLD-RTO: 0 /100 WBC
OHS QRS DURATION: 88 MS
OHS QTC CALCULATION: 471 MS
PLATELET # BLD AUTO: 234 K/UL (ref 150–450)
PMV BLD AUTO: 10 FL (ref 9.2–12.9)
POC ACTIVATED CLOTTING TIME K: 244 SEC (ref 74–137)
POC ACTIVATED CLOTTING TIME K: 263 SEC (ref 74–137)
POCT GLUCOSE: 113 MG/DL (ref 70–110)
POCT GLUCOSE: 115 MG/DL (ref 70–110)
POTASSIUM SERPL-SCNC: 3.6 MMOL/L (ref 3.5–5.1)
PROT SERPL-MCNC: 7.5 G/DL (ref 6–8.4)
PROTHROMBIN TIME: 10.9 SEC (ref 9–12.5)
RBC # BLD AUTO: 6.05 M/UL (ref 4.6–6.2)
SAMPLE: ABNORMAL
SAMPLE: ABNORMAL
SODIUM SERPL-SCNC: 140 MMOL/L (ref 136–145)
TROPONIN I SERPL DL<=0.01 NG/ML-MCNC: 0.02 NG/ML (ref 0–0.03)
WBC # BLD AUTO: 8.15 K/UL (ref 3.9–12.7)

## 2024-07-31 PROCEDURE — C1887 CATHETER, GUIDING: HCPCS | Performed by: INTERNAL MEDICINE

## 2024-07-31 PROCEDURE — 80053 COMPREHEN METABOLIC PANEL: CPT | Performed by: NURSE PRACTITIONER

## 2024-07-31 PROCEDURE — 63600175 PHARM REV CODE 636 W HCPCS: Mod: JZ,JG | Performed by: INTERNAL MEDICINE

## 2024-07-31 PROCEDURE — 85610 PROTHROMBIN TIME: CPT | Performed by: EMERGENCY MEDICINE

## 2024-07-31 PROCEDURE — 36415 COLL VENOUS BLD VENIPUNCTURE: CPT | Performed by: INTERNAL MEDICINE

## 2024-07-31 PROCEDURE — 86803 HEPATITIS C AB TEST: CPT | Performed by: EMERGENCY MEDICINE

## 2024-07-31 PROCEDURE — C1894 INTRO/SHEATH, NON-LASER: HCPCS | Performed by: INTERNAL MEDICINE

## 2024-07-31 PROCEDURE — 99152 MOD SED SAME PHYS/QHP 5/>YRS: CPT | Performed by: INTERNAL MEDICINE

## 2024-07-31 PROCEDURE — B2111ZZ FLUOROSCOPY OF MULTIPLE CORONARY ARTERIES USING LOW OSMOLAR CONTRAST: ICD-10-PCS | Performed by: INTERNAL MEDICINE

## 2024-07-31 PROCEDURE — 27201423 OPTIME MED/SURG SUP & DEVICES STERILE SUPPLY: Performed by: INTERNAL MEDICINE

## 2024-07-31 PROCEDURE — 027034Z DILATION OF CORONARY ARTERY, ONE ARTERY WITH DRUG-ELUTING INTRALUMINAL DEVICE, PERCUTANEOUS APPROACH: ICD-10-PCS | Performed by: INTERNAL MEDICINE

## 2024-07-31 PROCEDURE — 85347 COAGULATION TIME ACTIVATED: CPT | Performed by: INTERNAL MEDICINE

## 2024-07-31 PROCEDURE — 93458 L HRT ARTERY/VENTRICLE ANGIO: CPT | Mod: 26,XS,51, | Performed by: INTERNAL MEDICINE

## 2024-07-31 PROCEDURE — 4A023N7 MEASUREMENT OF CARDIAC SAMPLING AND PRESSURE, LEFT HEART, PERCUTANEOUS APPROACH: ICD-10-PCS | Performed by: INTERNAL MEDICINE

## 2024-07-31 PROCEDURE — C9606 PERC D-E COR REVASC W AMI S: HCPCS | Mod: LD | Performed by: INTERNAL MEDICINE

## 2024-07-31 PROCEDURE — 25500020 PHARM REV CODE 255: Performed by: INTERNAL MEDICINE

## 2024-07-31 PROCEDURE — 99223 1ST HOSP IP/OBS HIGH 75: CPT | Mod: 25,ICN,, | Performed by: INTERNAL MEDICINE

## 2024-07-31 PROCEDURE — 84484 ASSAY OF TROPONIN QUANT: CPT | Mod: 91 | Performed by: INTERNAL MEDICINE

## 2024-07-31 PROCEDURE — 25000003 PHARM REV CODE 250: Performed by: INTERNAL MEDICINE

## 2024-07-31 PROCEDURE — 36415 COLL VENOUS BLD VENIPUNCTURE: CPT | Performed by: NURSE PRACTITIONER

## 2024-07-31 PROCEDURE — 27000221 HC OXYGEN, UP TO 24 HOURS

## 2024-07-31 PROCEDURE — 92941 PRQ TRLML REVSC TOT OCCL AMI: CPT | Mod: LD,,, | Performed by: INTERNAL MEDICINE

## 2024-07-31 PROCEDURE — 25000003 PHARM REV CODE 250: Performed by: EMERGENCY MEDICINE

## 2024-07-31 PROCEDURE — C1874 STENT, COATED/COV W/DEL SYS: HCPCS | Performed by: INTERNAL MEDICINE

## 2024-07-31 PROCEDURE — 84484 ASSAY OF TROPONIN QUANT: CPT | Mod: 91 | Performed by: NURSE PRACTITIONER

## 2024-07-31 PROCEDURE — 99152 MOD SED SAME PHYS/QHP 5/>YRS: CPT | Mod: ,,, | Performed by: INTERNAL MEDICINE

## 2024-07-31 PROCEDURE — 87389 HIV-1 AG W/HIV-1&-2 AB AG IA: CPT | Performed by: EMERGENCY MEDICINE

## 2024-07-31 PROCEDURE — 99291 CRITICAL CARE FIRST HOUR: CPT

## 2024-07-31 PROCEDURE — 99153 MOD SED SAME PHYS/QHP EA: CPT | Performed by: INTERNAL MEDICINE

## 2024-07-31 PROCEDURE — 20000000 HC ICU ROOM

## 2024-07-31 PROCEDURE — 94761 N-INVAS EAR/PLS OXIMETRY MLT: CPT

## 2024-07-31 PROCEDURE — 83880 ASSAY OF NATRIURETIC PEPTIDE: CPT | Performed by: NURSE PRACTITIONER

## 2024-07-31 PROCEDURE — 93010 ELECTROCARDIOGRAM REPORT: CPT | Mod: ,,, | Performed by: INTERNAL MEDICINE

## 2024-07-31 PROCEDURE — 85025 COMPLETE CBC W/AUTO DIFF WBC: CPT | Performed by: NURSE PRACTITIONER

## 2024-07-31 PROCEDURE — 84484 ASSAY OF TROPONIN QUANT: CPT | Performed by: NURSE PRACTITIONER

## 2024-07-31 PROCEDURE — C1769 GUIDE WIRE: HCPCS | Performed by: INTERNAL MEDICINE

## 2024-07-31 PROCEDURE — 93005 ELECTROCARDIOGRAM TRACING: CPT

## 2024-07-31 PROCEDURE — C1725 CATH, TRANSLUMIN NON-LASER: HCPCS | Performed by: INTERNAL MEDICINE

## 2024-07-31 PROCEDURE — 93458 L HRT ARTERY/VENTRICLE ANGIO: CPT | Performed by: INTERNAL MEDICINE

## 2024-07-31 DEVICE — EVEROLIMUS-ELUTING PLATINUM CHROMIUM CORONARY STENT SYSTEM
Type: IMPLANTABLE DEVICE | Site: HEART | Status: FUNCTIONAL
Brand: SYNERGY™ XD

## 2024-07-31 RX ORDER — GABAPENTIN 600 MG/1
600 TABLET ORAL
COMMUNITY
End: 2024-08-16

## 2024-07-31 RX ORDER — PERFLUOROHEXYLOCTANE 1 MG/MG
1 SOLUTION OPHTHALMIC 4 TIMES DAILY
COMMUNITY
Start: 2024-07-03

## 2024-07-31 RX ORDER — TIROFIBAN HYDROCHLORIDE 50 UG/ML
INJECTION INTRAVENOUS
Status: DISCONTINUED | OUTPATIENT
Start: 2024-07-31 | End: 2024-07-31

## 2024-07-31 RX ORDER — VERAPAMIL HYDROCHLORIDE 2.5 MG/ML
INJECTION, SOLUTION INTRAVENOUS
Status: DISCONTINUED | OUTPATIENT
Start: 2024-07-31 | End: 2024-07-31 | Stop reason: HOSPADM

## 2024-07-31 RX ORDER — LIDOCAINE HYDROCHLORIDE 20 MG/ML
INJECTION, SOLUTION INFILTRATION; PERINEURAL
Status: DISCONTINUED | OUTPATIENT
Start: 2024-07-31 | End: 2024-07-31 | Stop reason: HOSPADM

## 2024-07-31 RX ORDER — PRASUGREL 10 MG/1
60 TABLET, FILM COATED ORAL ONCE
Status: COMPLETED | OUTPATIENT
Start: 2024-07-31 | End: 2024-07-31

## 2024-07-31 RX ORDER — TIROFIBAN HYDROCHLORIDE 50 UG/ML
0.07 INJECTION INTRAVENOUS CONTINUOUS
Status: ACTIVE | OUTPATIENT
Start: 2024-07-31 | End: 2024-08-01

## 2024-07-31 RX ORDER — HYDRALAZINE HYDROCHLORIDE 20 MG/ML
10 INJECTION INTRAMUSCULAR; INTRAVENOUS EVERY 4 HOURS PRN
Status: DISCONTINUED | OUTPATIENT
Start: 2024-07-31 | End: 2024-08-02 | Stop reason: HOSPADM

## 2024-07-31 RX ORDER — HEPARIN SODIUM 1000 [USP'U]/ML
INJECTION, SOLUTION INTRAVENOUS; SUBCUTANEOUS
Status: DISCONTINUED | OUTPATIENT
Start: 2024-07-31 | End: 2024-07-31 | Stop reason: HOSPADM

## 2024-07-31 RX ORDER — MUPIROCIN 20 MG/G
OINTMENT TOPICAL 2 TIMES DAILY
Status: DISCONTINUED | OUTPATIENT
Start: 2024-07-31 | End: 2024-08-02 | Stop reason: HOSPADM

## 2024-07-31 RX ORDER — VALSARTAN AND HYDROCHLOROTHIAZIDE 80; 12.5 MG/1; MG/1
1 TABLET, FILM COATED ORAL
Status: ON HOLD | COMMUNITY
Start: 2024-07-29 | End: 2024-08-02 | Stop reason: HOSPADM

## 2024-07-31 RX ORDER — ASPIRIN 81 MG/1
81 TABLET ORAL DAILY
Status: DISCONTINUED | OUTPATIENT
Start: 2024-08-01 | End: 2024-08-02 | Stop reason: HOSPADM

## 2024-07-31 RX ORDER — SODIUM CHLORIDE 9 MG/ML
INJECTION, SOLUTION INTRAVENOUS CONTINUOUS
Status: ACTIVE | OUTPATIENT
Start: 2024-07-31 | End: 2024-08-01

## 2024-07-31 RX ORDER — ASPIRIN 325 MG
325 TABLET ORAL
Status: COMPLETED | OUTPATIENT
Start: 2024-07-31 | End: 2024-07-31

## 2024-07-31 RX ORDER — IBUPROFEN 200 MG
1 TABLET ORAL DAILY
Status: DISCONTINUED | OUTPATIENT
Start: 2024-07-31 | End: 2024-08-02 | Stop reason: HOSPADM

## 2024-07-31 RX ORDER — ATORVASTATIN CALCIUM 40 MG/1
80 TABLET, FILM COATED ORAL NIGHTLY
Status: DISCONTINUED | OUTPATIENT
Start: 2024-07-31 | End: 2024-08-02 | Stop reason: HOSPADM

## 2024-07-31 RX ORDER — ZOLPIDEM TARTRATE 10 MG/1
10 TABLET ORAL NIGHTLY PRN
Status: ON HOLD | COMMUNITY
End: 2024-08-02 | Stop reason: HOSPADM

## 2024-07-31 RX ORDER — LEVETIRACETAM 500 MG/1
500 TABLET ORAL
COMMUNITY
End: 2024-08-16

## 2024-07-31 RX ORDER — NAPROXEN SODIUM 220 MG/1
81 TABLET, FILM COATED ORAL
COMMUNITY
Start: 2024-07-29 | End: 2024-08-16 | Stop reason: SDUPTHER

## 2024-07-31 RX ORDER — METOPROLOL TARTRATE 25 MG/1
25 TABLET, FILM COATED ORAL 2 TIMES DAILY
Status: DISCONTINUED | OUTPATIENT
Start: 2024-07-31 | End: 2024-08-02 | Stop reason: HOSPADM

## 2024-07-31 RX ORDER — LISINOPRIL AND HYDROCHLOROTHIAZIDE 20; 25 MG/1; MG/1
1 TABLET ORAL DAILY
Status: ON HOLD | COMMUNITY
End: 2024-08-02 | Stop reason: HOSPADM

## 2024-07-31 RX ORDER — MIDAZOLAM HYDROCHLORIDE 1 MG/ML
INJECTION, SOLUTION INTRAMUSCULAR; INTRAVENOUS
Status: DISCONTINUED | OUTPATIENT
Start: 2024-07-31 | End: 2024-07-31 | Stop reason: HOSPADM

## 2024-07-31 RX ORDER — PRASUGREL 10 MG/1
10 TABLET, FILM COATED ORAL DAILY
Status: DISCONTINUED | OUTPATIENT
Start: 2024-08-01 | End: 2024-08-02 | Stop reason: HOSPADM

## 2024-07-31 RX ORDER — FENTANYL CITRATE 50 UG/ML
INJECTION, SOLUTION INTRAMUSCULAR; INTRAVENOUS
Status: DISCONTINUED | OUTPATIENT
Start: 2024-07-31 | End: 2024-07-31 | Stop reason: HOSPADM

## 2024-07-31 RX ORDER — ATORVASTATIN CALCIUM 20 MG/1
20 TABLET, FILM COATED ORAL NIGHTLY
Status: ON HOLD | COMMUNITY
Start: 2024-07-29 | End: 2024-08-02 | Stop reason: HOSPADM

## 2024-07-31 RX ORDER — ONDANSETRON 8 MG/1
8 TABLET, ORALLY DISINTEGRATING ORAL EVERY 8 HOURS PRN
Status: DISCONTINUED | OUTPATIENT
Start: 2024-07-31 | End: 2024-08-02 | Stop reason: HOSPADM

## 2024-07-31 RX ORDER — AMLODIPINE BESYLATE 10 MG/1
10 TABLET ORAL
Status: ON HOLD | COMMUNITY
Start: 2024-07-29 | End: 2024-08-02 | Stop reason: HOSPADM

## 2024-07-31 RX ORDER — NITROGLYCERIN 5 MG/ML
INJECTION, SOLUTION INTRAVENOUS
Status: DISCONTINUED | OUTPATIENT
Start: 2024-07-31 | End: 2024-07-31 | Stop reason: HOSPADM

## 2024-07-31 RX ORDER — HYDROCODONE BITARTRATE AND ACETAMINOPHEN 5; 325 MG/1; MG/1
1 TABLET ORAL EVERY 4 HOURS PRN
Status: DISCONTINUED | OUTPATIENT
Start: 2024-07-31 | End: 2024-08-02 | Stop reason: HOSPADM

## 2024-07-31 RX ORDER — MORPHINE SULFATE 4 MG/ML
3 INJECTION, SOLUTION INTRAMUSCULAR; INTRAVENOUS EVERY 4 HOURS PRN
Status: DISCONTINUED | OUTPATIENT
Start: 2024-07-31 | End: 2024-08-02 | Stop reason: HOSPADM

## 2024-07-31 RX ADMIN — TIROFIBAN 0.07 MCG/KG/MIN: 5 INJECTION, SOLUTION INTRAVENOUS at 01:07

## 2024-07-31 RX ADMIN — SODIUM CHLORIDE: 9 INJECTION, SOLUTION INTRAVENOUS at 01:07

## 2024-07-31 RX ADMIN — METOPROLOL TARTRATE 25 MG: 25 TABLET, FILM COATED ORAL at 09:07

## 2024-07-31 RX ADMIN — PRASUGREL 60 MG: 10 TABLET, FILM COATED ORAL at 12:07

## 2024-07-31 RX ADMIN — ATORVASTATIN CALCIUM 80 MG: 40 TABLET, FILM COATED ORAL at 09:07

## 2024-07-31 RX ADMIN — MUPIROCIN: 20 OINTMENT TOPICAL at 09:07

## 2024-07-31 RX ADMIN — ASPIRIN 325 MG ORAL TABLET 325 MG: 325 PILL ORAL at 11:07

## 2024-07-31 RX ADMIN — METOPROLOL TARTRATE 25 MG: 25 TABLET, FILM COATED ORAL at 01:07

## 2024-07-31 NOTE — Clinical Note
250 ml of contrast were injected throughout the case. 0 mL of contrast was the total wasted during the case. 250 mL was the total amount used during the case.

## 2024-07-31 NOTE — ASSESSMENT & PLAN NOTE
Chronic, controlled. Latest blood pressure and vitals reviewed-     Temp:  [97 °F (36.1 °C)-97.9 °F (36.6 °C)]   Pulse:  [64-80]   Resp:  [18-26]   BP: (108-142)/(69-81)   SpO2:  [98 %-99 %] .   Home meds for hypertension were reviewed and noted below.   Hypertension Medications               amLODIPine (NORVASC) 10 MG tablet Take 10 mg by mouth.    valsartan-hydrochlorothiazide (DIOVAN-HCT) 80-12.5 mg per tablet Take 1 tablet by mouth.    lisinopriL-hydrochlorothiazide (PRINZIDE,ZESTORETIC) 20-25 mg Tab Take 1 tablet by mouth once daily.    losartan (COZAAR) 25 MG tablet Take 1 tablet (25 mg total) by mouth once daily.            While in the hospital, will manage blood pressure as follows; metoprolol, titrate as needed    Will utilize p.r.n. blood pressure medication only if patient's blood pressure greater than 180/110 and he develops symptoms such as worsening chest pain or shortness of breath.

## 2024-07-31 NOTE — OP NOTE
[facility] - Cath Lab ([facility])  Cardiac Catheterization  Procedure Note    SUMMARY     Toño Nelson  [id number]  No, Primary Doctor    Date of Procedure: 7/31/2024    Procedure:  1. LHC 2. LEFT VENTRICULOGRAM 3. CORONARY ANGIOGRAM  4. PCI LAD    Provider: Josh Gonzalez MD    Indications: He was referred for cardiac catheterization to further evaluate recent anterior infarct (delayed subacute anterior STEMI).    Pre-Procedure Diagnosis: recent anterior infarct (subacute anterior STEMI).    Post-Procedure Diagnosis: PCI occluded mid LAD.    Anesthesia: RN IV Sedation    Description of the Findings of the Procedure:     The risks, benefits, complications, treatment options, and expected outcomes were discussed with the patient. The patient and/or family concurred with the proposed plan, giving informed consent. Patient was brought to the cath lab after IV hydration was begun and oral premedication was given.    FINDINGS:      Successful PCI occluded mid LAD treated with 2.25 x 20 mm Synergy GUCCI with 0% residual stenosis for delayed subacute anterior STEMI presentation.    EF 25% with WMA.    Left radial approach.    See report for full details.        Complications: None; patient tolerated the procedure well.    Estimated Blood Loss (EBL): Minimal           Implants: GUCCI x one    Specimens: none    Condition: stable    Disposition:  ICU, stable.    Attestation: I was present and scrubbed for the entire procedure.    Recommendations:    Usual post PCI care.  Admit to ICU.  Asa, Effient.  Aggrastat gtt.  Maximize med tx for CAD/CHF.  Life Vest.

## 2024-07-31 NOTE — CONSULTS
[facility] - Intensive Care ([facility])  Critical Care Medicine  Consult Note    Patient Name: Toño Nelson  MRN: [id number]  Admission Date: 7/31/2024  Hospital Length of Stay: 0 days  Code Status: No Order  Attending Physician: Josh Gonzalez MD   Primary Care Provider: No, Primary Doctor   Principal Problem: <principal problem not specified>    Consults  Subjective:     HPI:  Mr Nelson is a 50 y/o WM with ongoing tobacco dependence (1.5pk/day), HTN, HLD, and h/o noncompliance with meds who presented to the ER today on the prompting of his work due to abnormal EKG.  He says that he recently underwent a work physical and was told his EKG was abnormal.  Had a repeat and they told him go to the ER.  EKG shows STEMI and cath lab was activated emergently.  Pt reported on and off chest pain/pressure for about the last month.   Denies F/C, palpitations, diaphoresis, shortness of breath, change in diet, change in bowel/bladder, N/V.      LHC done by Dr Gonzalez showed and occluded LAD and LVEF of 25%.  LAD was treated with PCI.  Findings likely subacute given month long symptoms.    Hospital/ICU Course:  No notes on file    Past Medical History:   Diagnosis Date    Chronic pain     associated with injury       Past Surgical History:   Procedure Laterality Date    BRAIN SURGERY      EYE SURGERY      SHOULDER SURGERY      post trauma fell 19 feet       Review of patient's allergies indicates:  No Known Allergies    Family History       Problem Relation (Age of Onset)    Cancer Father    Hypertension Mother    Peripheral vascular disease Mother          Tobacco Use    Smoking status: Every Day     Current packs/day: 2.00     Types: Cigarettes    Smokeless tobacco: Never   Substance and Sexual Activity    Alcohol use: No    Drug use: Yes    Sexual activity: Not on file         Review of Systems   All other systems reviewed and are negative.    Objective:     Vital Signs (Most Recent):  Temp: 97 °F (36.1 °C) (07/31/24  1319)  Pulse: 64 (07/31/24 1319)  Resp: (!) 25 (07/31/24 1319)  BP: (!) 142/81 (07/31/24 1334)  SpO2: 99 % (07/31/24 1319) Vital Signs (24h Range):  Temp:  [97 °F (36.1 °C)-97.9 °F (36.6 °C)] 97 °F (36.1 °C)  Pulse:  [64-80] 64  Resp:  [18-26] 25  SpO2:  [98 %-99 %] 99 %  BP: (108-142)/(69-81) 142/81     Weight: 85.6 kg (188 lb 11.4 oz)  Body mass index is 29.56 kg/m².    No intake or output data in the 24 hours ending 07/31/24 1343     Physical Exam  Vitals and nursing note reviewed.   Constitutional:       General: He is not in acute distress.     Comments: Ambulating around his room   HENT:      Head: Normocephalic and atraumatic.   Eyes:      General: No scleral icterus.     Extraocular Movements: Extraocular movements intact.      Conjunctiva/sclera: Conjunctivae normal.      Pupils: Pupils are equal, round, and reactive to light.   Cardiovascular:      Rate and Rhythm: Normal rate and regular rhythm.      Pulses: Normal pulses.      Heart sounds: No murmur heard.  Pulmonary:      Effort: Pulmonary effort is normal. No respiratory distress.      Breath sounds: No wheezing, rhonchi or rales.   Abdominal:      General: Abdomen is flat. Bowel sounds are normal. There is no distension.      Palpations: Abdomen is soft.   Musculoskeletal:      Cervical back: Normal range of motion and neck supple. No rigidity or tenderness.      Right lower leg: No edema.      Left lower leg: No edema.   Neurological:      General: No focal deficit present.      Mental Status: He is alert. Mental status is at baseline.          Vents:  Oxygen Concentration (%): 28 (07/31/24 1127)    Lines/Drains/Airways       Peripheral Intravenous Line  Duration                  Peripheral IV - Single Lumen 07/31/24 1121 18 G Left;Posterior Hand <1 day         Peripheral IV - Single Lumen 07/31/24 1127 18 G Right Antecubital <1 day                    Significant Labs:    CBC/Anemia Profile:  Recent Labs   Lab 07/30/24  0853 07/31/24  1122   WBC  "7.94 8.15   HGB 17.2 17.4   HCT 49.7 50.2    234   MCV 84 83   RDW 13.9 14.0        Chemistries:  Recent Labs   Lab 07/30/24  0853 07/31/24  1122    140   K 4.1 3.6    104   CO2 28 24   BUN 22* 30*   CREATININE 1.3 1.6*   CALCIUM 10.4 9.9   ALBUMIN 4.2 4.3   PROT 7.4 7.5   BILITOT 0.6 0.9   ALKPHOS 109 119   ALT 22 22   AST 10 13       All pertinent labs within the past 24 hours have been reviewed.    Significant Imaging:   I have reviewed all pertinent imaging results/findings within the past 24 hours.    ABG  No results for input(s): "PH", "PO2", "PCO2", "HCO3", "BE" in the last 168 hours.  Assessment/Plan:     Cardiac/Vascular  Hyperlipidemia  - statin      Hypertension  Chronic, controlled. Latest blood pressure and vitals reviewed-     Temp:  [97 °F (36.1 °C)-97.9 °F (36.6 °C)]   Pulse:  [64-80]   Resp:  [18-26]   BP: (108-142)/(69-81)   SpO2:  [98 %-99 %] .   Home meds for hypertension were reviewed and noted below.   Hypertension Medications               amLODIPine (NORVASC) 10 MG tablet Take 10 mg by mouth.    valsartan-hydrochlorothiazide (DIOVAN-HCT) 80-12.5 mg per tablet Take 1 tablet by mouth.    lisinopriL-hydrochlorothiazide (PRINZIDE,ZESTORETIC) 20-25 mg Tab Take 1 tablet by mouth once daily.    losartan (COZAAR) 25 MG tablet Take 1 tablet (25 mg total) by mouth once daily.            While in the hospital, will manage blood pressure as follows; metoprolol, titrate as needed    Will utilize p.r.n. blood pressure medication only if patient's blood pressure greater than 180/110 and he develops symptoms such as worsening chest pain or shortness of breath.    Myocardial infarction  - s/p C with Dr Gonzalez 7/31 with PCI to occluded LAD  - ASA/statin  - getting Aggrastat infusion x 12 hrs  - Effient  - consult to dietician for education  - cardiac monitoring    Other  Tobacco abuse  - encouraged cessation  - patient expresses no wish to quit at this time  - declined nicotine patch     "     Critical Care Time: 38 minutes  Critical secondary to high risk monitoring     Critical care was time spent personally by me on the following activities: development of treatment plan with patient or surrogate and bedside caregivers, discussions with consultants, evaluation of patient's response to treatment, examination of patient, ordering and performing treatments and interventions, ordering and review of laboratory studies, ordering and review of radiographic studies, pulse oximetry, re-evaluation of patient's condition. This critical care time did not overlap with that of any other provider or involve time for any procedures.    Thank you for your consult. I will follow-up with patient. Please contact us if you have any additional questions.     Cory Lopez MD  Critical Care Medicine  [facility] - Intensive Care ([facility])

## 2024-07-31 NOTE — ED PROVIDER NOTES
SCRIBE #1 NOTE: IDoc, am scribing for, and in the presence of, Aurelio Esposito MD. I have scribed the entire note.       History     Chief Complaint   Patient presents with    Abnormal ECG     Pt. C/o being sent to ED due to having 2 abnormal EKG's one Friday and one yesterday. Pt denies any chest pain, but states he has intermittent left finger numbness. Pt also states he had slight chest tightness intermittently      Review of patient's allergies indicates:  No Known Allergies      History of Present Illness     HPI    7/31/2024, 11:18 AM  History obtained from the patient      History of Present Illness: Toño Nelson is a 49 y.o. male patient who presents to the Emergency Department for evaluation of abnormal EKG which onset Friday. Pt had a physical at his work which included an EKG. Pt failed the EKG section due to it reading that he had a STEMI. Pt was told his results yesterday and recommended to come to the ER, however he did not. Pt's PCP prescribed him medication yesterday to which the patient reports compliance. Pt reports associated L sided CP (heaviness) which radiates down his L arm and tingling to his L arm onset a month ago. No further complaints or concerns at this time.       Arrival mode: Personal vehicle      PCP: Elvis Hein MD        Past Medical History:  Past Medical History:   Diagnosis Date    Chronic pain     associated with injury       Past Surgical History:  Past Surgical History:   Procedure Laterality Date    BRAIN SURGERY      EYE SURGERY      SHOULDER SURGERY      post trauma fell 19 feet         Family History:  Family History   Problem Relation Name Age of Onset    Peripheral vascular disease Mother      Hypertension Mother      Cancer Father         Social History:  Social History     Tobacco Use    Smoking status: Every Day     Current packs/day: 2.00     Types: Cigarettes    Smokeless tobacco: Never   Substance and Sexual Activity    Alcohol use:  No    Drug use: Yes    Sexual activity: Not on file        Review of Systems     Review of Systems   Constitutional:  Negative for chills, diaphoresis and fever.   HENT:  Negative for congestion.    Respiratory:  Negative for cough and shortness of breath.    Cardiovascular:  Positive for chest pain (L sided).   Gastrointestinal:  Negative for abdominal pain, diarrhea, nausea and vomiting.   Genitourinary:  Negative for dysuria.   Musculoskeletal:  Negative for back pain.   Skin:  Negative for rash.   Neurological:  Negative for dizziness and headaches.        (+) tingling L arm   All other systems reviewed and are negative.       Physical Exam     Initial Vitals [07/31/24 1102]   BP Pulse Resp Temp SpO2   108/70 80 18 97.9 °F (36.6 °C) 98 %      MAP       --          Physical Exam  Nursing Notes and Vital Signs Reviewed.  Constitutional: Patient is in no acute distress. Well-developed and well-nourished.  Head: Atraumatic. Normocephalic.  Eyes: EOM intact. Conjunctivae are not pale. No scleral icterus.  ENT: Mucous membranes are moist.   Neck: Supple. Full ROM.   Cardiovascular: Regular rate. Regular rhythm. No murmurs, rubs, or gallops.   Pulmonary/Chest: No respiratory distress. Clear to auscultation bilaterally. No wheezing or rales.  Abdominal: Soft and non-distended.  There is no tenderness.  No rebound, guarding, or rigidity.   Musculoskeletal: Moves all extremities. No obvious deformities. No edema.   Skin: Warm and dry.  Neurological:  Alert, awake, and appropriate.  Normal speech.  No acute focal neurological deficits are appreciated.  Psychiatric: Normal affect. Good eye contact. Appropriate in content.        ED Course   Critical Care    Date/Time: 7/31/2024 11:27 AM    Performed by: Aurelio Esposito MD  Authorized by: Aurelio Esposito MD  Direct patient critical care time: 22 minutes  Additional history critical care time: 20 minutes  Ordering / reviewing critical care time: 10 minutes  Documentation  critical care time: 10 minutes  Consulting other physicians critical care time: 10 minutes  Total critical care time (exclusive of procedural time) : 72 minutes  Critical care time was exclusive of separately billable procedures and treating other patients and teaching time.  Critical care was necessary to treat or prevent imminent or life-threatening deterioration of the following conditions: STEMI.  Critical care was time spent personally by me on the following activities: blood draw for specimens, development of treatment plan with patient or surrogate, discussions with consultants, interpretation of cardiac output measurements, evaluation of patient's response to treatment, examination of patient, obtaining history from patient or surrogate, ordering and performing treatments and interventions, ordering and review of laboratory studies, ordering and review of radiographic studies, pulse oximetry, re-evaluation of patient's condition and review of old charts.        ED Vital Signs:  Vitals:    07/31/24 1505 07/31/24 1520 07/31/24 1535 07/31/24 1550   BP: 126/81 117/76 124/81 120/89   Pulse: (!) 58 (!) 56 (!) 58 (!) 59   Resp: (!) 30 19 20 (!) 25   Temp: 97.1 °F (36.2 °C)      TempSrc: Axillary      SpO2: 100% 99% 100% 99%   Weight:        07/31/24 1605 07/31/24 1620 07/31/24 1635 07/31/24 1650   BP: 115/86 132/85 120/73    Pulse: 60 62 (!) 59 67   Resp: (!) 32 (!) 32 18 (!) 39   Temp:       TempSrc:       SpO2: 98% 99% 99% 96%   Weight:        07/31/24 1705 07/31/24 1716 07/31/24 1735 07/31/24 1745   BP: 127/84  115/77    Pulse: 62 61 62 64   Resp: (!) 40 (!) 41 20 (!) 34   Temp:       TempSrc:       SpO2: 100% 100% 98% 99%   Weight:        07/31/24 1805 07/31/24 1812 07/31/24 1900   BP: 111/74  109/71   Pulse: 63 66 73   Resp: (!) 35 (!) 25 (!) 61   Temp:      TempSrc:      SpO2: 100% 99% 99%   Weight:          Abnormal Lab Results:  Labs Reviewed   HEP C VIRUS HOLD SPECIMEN       Result Value    HEP C Virus  Hold Specimen Hold for HCV sendout      Narrative:     Release to patient->Immediate        All Lab Results:  Results for orders placed or performed during the hospital encounter of 07/31/24   CBC auto differential   Result Value Ref Range    WBC 8.15 3.90 - 12.70 K/uL    RBC 6.05 4.60 - 6.20 M/uL    Hemoglobin 17.4 14.0 - 18.0 g/dL    Hematocrit 50.2 40.0 - 54.0 %    MCV 83 82 - 98 fL    MCH 28.8 27.0 - 31.0 pg    MCHC 34.7 32.0 - 36.0 g/dL    RDW 14.0 11.5 - 14.5 %    Platelets 234 150 - 450 K/uL    MPV 10.0 9.2 - 12.9 fL    Immature Granulocytes 0.4 0.0 - 0.5 %    Gran # (ANC) 4.9 1.8 - 7.7 K/uL    Immature Grans (Abs) 0.03 0.00 - 0.04 K/uL    Lymph # 2.2 1.0 - 4.8 K/uL    Mono # 0.7 0.3 - 1.0 K/uL    Eos # 0.2 0.0 - 0.5 K/uL    Baso # 0.07 0.00 - 0.20 K/uL    nRBC 0 0 /100 WBC    Gran % 60.5 38.0 - 73.0 %    Lymph % 27.2 18.0 - 48.0 %    Mono % 8.3 4.0 - 15.0 %    Eosinophil % 2.7 0.0 - 8.0 %    Basophil % 0.9 0.0 - 1.9 %    Differential Method Automated    Comprehensive metabolic panel   Result Value Ref Range    Sodium 140 136 - 145 mmol/L    Potassium 3.6 3.5 - 5.1 mmol/L    Chloride 104 95 - 110 mmol/L    CO2 24 23 - 29 mmol/L    Glucose 98 70 - 110 mg/dL    BUN 30 (H) 6 - 20 mg/dL    Creatinine 1.6 (H) 0.5 - 1.4 mg/dL    Calcium 9.9 8.7 - 10.5 mg/dL    Total Protein 7.5 6.0 - 8.4 g/dL    Albumin 4.3 3.5 - 5.2 g/dL    Total Bilirubin 0.9 0.1 - 1.0 mg/dL    Alkaline Phosphatase 119 55 - 135 U/L    AST 13 10 - 40 U/L    ALT 22 10 - 44 U/L    eGFR 53 (A) >60 mL/min/1.73 m^2    Anion Gap 12 8 - 16 mmol/L   Troponin I #1   Result Value Ref Range    Troponin I 0.017 0.000 - 0.026 ng/mL   Troponin I #2   Result Value Ref Range    Troponin I 0.016 0.000 - 0.026 ng/mL   BNP   Result Value Ref Range    BNP 36 0 - 99 pg/mL   HIV 1/2 Ag/Ab (4th Gen)   Result Value Ref Range    HIV 1/2 Ag/Ab Negative Negative   Hepatitis C Antibody   Result Value Ref Range    Hepatitis C Ab Negative Negative   HCV Virus Hold Specimen    Result Value Ref Range    HEP C Virus Hold Specimen Hold for HCV sendout    Protime-INR   Result Value Ref Range    Prothrombin Time 10.9 9.0 - 12.5 sec    INR 0.9 0.8 - 1.2   EKG 12-lead   Result Value Ref Range    QRS Duration 88 ms    [facility] QTC Calculation 471 ms   Cardiac catheterization   Result Value Ref Range    Cath EF Quantitative 25 %   ISTAT ACT-K   Result Value Ref Range    POC ACTIVATED CLOTTING TIME K 263 (H) 74 - 137 sec    Sample unknown    ISTAT ACT-K   Result Value Ref Range    POC ACTIVATED CLOTTING TIME K 244 (H) 74 - 137 sec    Sample unknown    POCT glucose   Result Value Ref Range    POCT Glucose 113 (H) 70 - 110 mg/dL         Imaging Results:  Imaging Results              X-Ray Chest AP Portable (Final result)  Result time 07/31/24 11:40:49      Final result by Jarred Yarbrough MD (07/31/24 11:40:49)                   Impression:      1.  Negative for acute process involving the chest.    2.  Stable findings as noted above.      Electronically signed by: Jarred Yarbrough MD  Date:    07/31/2024  Time:    11:40               Narrative:    EXAMINATION:  XR CHEST AP PORTABLE    CLINICAL HISTORY:  Chest Pain;    COMPARISON:  Studies dating back to September 22, 2019    FINDINGS:  EKG leads overlie the chest, as does oxygen tubing.  Clothing artifact present.  The lungs are clear. The cardiac silhouette size is normal. The trachea is midline and the mediastinal width is normal. Negative for focal infiltrate, effusion or pneumothorax. Pulmonary vasculature is normal. Negative for acute osseous abnormalities. Stable old left-sided rib fractures.  Stable ectatic and tortuous aorta, as well as degenerative changes of the spine and left greater than right shoulder girdles.                                       The EKG was ordered, reviewed, and independently interpreted by the ED provider.  Interpretation time: 11:04 AM  Rate: 85 BPM  Rhythm: normal sinus rhythm  Interpretation: Cannot rule out anterior  infarct. Anterolateral infarct. No STEMI.           The Emergency Provider reviewed the vital signs and test results, which are outlined above.     ED Discussion     11:30 AM: Discussed case with Dr. Gonzalez (Interventional cardiology). Dr. Gonzalez agrees with current care and management of pt and accepts admission.   Admitting Service: Interventional Cardiology  Admitting Physician: Dr. Gonzalez  Admit to: cath lab    11:35 AM: Re-evaluated pt. I have discussed test results, shared treatment plan, and the need for admission with patient and friend at bedside. Pt and friend express understanding at this time and agree with all information. All questions answered. Pt and friend have no further questions or concerns at this time. Pt is ready for admit.           Medical Decision Making  Amount and/or Complexity of Data Reviewed  Labs: ordered. Decision-making details documented in ED Course.  Radiology: ordered. Decision-making details documented in ED Course.  ECG/medicine tests: ordered and independent interpretation performed. Decision-making details documented in ED Course.    Risk  OTC drugs.  Decision regarding hospitalization.    Critical Care  Total time providing critical care: 72 minutes                ED Medication(s):  Medications   ondansetron disintegrating tablet 8 mg (has no administration in time range)   0.9%  NaCl infusion ( Intravenous Verify Only 7/31/24 1900)   HYDROcodone-acetaminophen 5-325 mg per tablet 1 tablet (has no administration in time range)   morphine injection 3 mg (has no administration in time range)   prasugreL tablet 10 mg (has no administration in time range)   aspirin EC tablet 81 mg (has no administration in time range)   hydrALAZINE injection 10 mg (has no administration in time range)   tirofiban 12.5 mg in sodium chloride 0.9% 250 mL infusion (0.075 mcg/kg/min × 85.6 kg Intravenous Verify Only 7/31/24 1900)   atorvastatin tablet 80 mg (has no administration in time range)    metoprolol tartrate (LOPRESSOR) tablet 25 mg (25 mg Oral Given 7/31/24 1334)   mupirocin 2 % ointment (has no administration in time range)   nicotine 14 mg/24 hr 1 patch (1 patch Transdermal Not Given 7/31/24 1600)   aspirin tablet 325 mg (325 mg Oral Given 7/31/24 1125)   prasugreL tablet 60 mg (60 mg Oral Given 7/31/24 1249)       Current Discharge Medication List                  Scribe Attestation:   Scribe #1: I performed the above scribed service and the documentation accurately describes the services I performed. I attest to the accuracy of the note.     Attending:   Physician Attestation Statement for Scribe #1: I, Aurelio Esposito MD, personally performed the services described in this documentation, as scribed by Doc Negron, in my presence, and it is both accurate and complete.           Clinical Impression       ICD-10-CM ICD-9-CM   1. Chest pain  R07.9 786.50   2. ST elevation myocardial infarction (STEMI), unspecified artery  I21.3 410.90   3. Anterior wall myocardial infarction  I21.09 410.10   4. Angina pectoris  I20.9 413.9       Disposition:   Disposition: Admitted  Condition: Serious         Aurelio Esposito MD  07/31/24 0492

## 2024-07-31 NOTE — ASSESSMENT & PLAN NOTE
-Presents with intermittent left-sided CP/pressure with recent worsening over past week, currently PC free during exam  -EKG yesterday with acute anterolateral MI/STEMI, repeat today with concerning ST-T changes/Q waves, delayed presentation  -ASA, heparin gtt, statin  -TTE ordered  -Urgent LHC today by Dr. Gonzalez. All risks, benefits, and treatment alternatives explained to patient in detail. All questions answered. He has agreed to proceed. Further re'cs to follow.

## 2024-07-31 NOTE — PLAN OF CARE
Pt aaox4. SB on monitor. BP stable. Afebrile. Room air. Tolerating diet. Voids per urinal. L radial puncture site CDI. No hematoma. Pt ambulates independently. PIVs intact. Bed low, wheels locked, alarms audible. POC reviewed.

## 2024-07-31 NOTE — ASSESSMENT & PLAN NOTE
- encouraged cessation  - patient expresses no wish to quit at this time  - declined nicotine patch

## 2024-07-31 NOTE — ASSESSMENT & PLAN NOTE
- s/p LHC with Dr Gonzalez 7/31 with PCI to occluded LAD  - ASA/statin  - getting Aggrastat infusion x 12 hrs  - Effient  - consult to dietician for education  - cardiac monitoring

## 2024-07-31 NOTE — H&P
[facility] - Cath Lab ([facility])  Cardiology  History and Physical     Patient Name: Toño Nelson  MRN: [id number]  Admission Date: 7/31/2024  Code Status: No Order   Attending Provider: No att. providers found   Primary Care Physician: No, Primary Doctor  Principal Problem:<principal problem not specified>    Patient information was obtained from patient, past medical records, and ER records.     Subjective:     Chief Complaint:  MI    HPI:  Mr. Nelson is a 49 year old male patient whose current medical conditions include long time tobacco abuse,  HTN, hyperlipidemia, prior CVA (by CT scan) who presented to [facility] ED today due to abnormal EKG. Patient recently had his work physical which included an EKG which resulted back with acute MI/STEMI yesterday. He was called and advised to come to the ED but did not show up until today as he was at work. He endorses intermittent bouts of substernal, left-sided chest pressure/tightness, ongoing for the past month o. Recently worsening over the past week. Reports pain radiates down his left arm at times. Previously thought was pain was due to prior traumatic fall he had many years ago. He denies any associated SOB, nausea, vomiting, palpitations, near syncope, or syncope. No prior CV history. Remote history of drug abuse (cocaine, marijuana) but denies any recent usage. States he established care with a new PCP in January and has been compliant with those medications. EKG reviewed, SR with anterolateral infarct---subacute with delayed presentation. C planned today by Dr. Gonzalez, further rec's to follow.        Past Medical History:   Diagnosis Date    Chronic pain     associated with injury       Past Surgical History:   Procedure Laterality Date    BRAIN SURGERY      EYE SURGERY      SHOULDER SURGERY      post trauma fell 19 feet       Review of patient's allergies indicates:  No Known Allergies    No current facility-administered medications on file prior to  encounter.     Current Outpatient Medications on File Prior to Encounter   Medication Sig    amLODIPine (NORVASC) 10 MG tablet Take 10 mg by mouth.    aspirin 81 MG Chew Take 81 mg by mouth.    atorvastatin (LIPITOR) 20 MG tablet Take 20 mg by mouth every evening.    MIEBO 100 % Drop Place 1 drop into both eyes 4 (four) times daily.    valsartan-hydrochlorothiazide (DIOVAN-HCT) 80-12.5 mg per tablet Take 1 tablet by mouth.    gabapentin (NEURONTIN) 600 MG tablet Take 600 mg by mouth.    levETIRAcetam (KEPPRA) 500 MG Tab Take 500 mg by mouth.    lisinopriL-hydrochlorothiazide (PRINZIDE,ZESTORETIC) 20-25 mg Tab Take 1 tablet by mouth once daily.    losartan (COZAAR) 25 MG tablet Take 1 tablet (25 mg total) by mouth once daily.    naloxone (NARCAN) 4 mg/actuation Spry 4mg by nasal route as needed for opioid overdose; may repeat every 2-3 minutes in alternating nostrils until medical help arrives. Call 911 (Patient not taking: Reported on 1/3/2024)    zolpidem (AMBIEN) 10 mg Tab Take 10 mg by mouth nightly as needed.     Family History       Problem Relation (Age of Onset)    Cancer Father    Hypertension Mother    Peripheral vascular disease Mother          Tobacco Use    Smoking status: Every Day     Current packs/day: 2.00     Types: Cigarettes    Smokeless tobacco: Never   Substance and Sexual Activity    Alcohol use: No    Drug use: Yes    Sexual activity: Not on file     Review of Systems   Constitutional: Positive for malaise/fatigue.   HENT: Negative.     Eyes: Negative.    Cardiovascular:  Positive for chest pain.   Respiratory: Negative.     Endocrine: Negative.    Hematologic/Lymphatic: Negative.    Skin: Negative.    Musculoskeletal:  Positive for joint pain (L arm).   Gastrointestinal: Negative.    Genitourinary: Negative.    Neurological:  Positive for numbness (L arm).   Psychiatric/Behavioral: Negative.     Allergic/Immunologic: Negative.      Objective:     Vital Signs (Most Recent):  Temp: 97.9 °F  (36.6 °C) (07/31/24 1102)  Pulse: 80 (07/31/24 1127)  Resp: (!) 26 (07/31/24 1127)  BP: 127/80 (07/31/24 1127)  SpO2: 99 % (07/31/24 1127) Vital Signs (24h Range):  Temp:  [97.9 °F (36.6 °C)] 97.9 °F (36.6 °C)  Pulse:  [80] 80  Resp:  [18-26] 26  SpO2:  [98 %-99 %] 99 %  BP: (108-127)/(70-80) 127/80     Weight: 85.6 kg (188 lb 11.4 oz)  Body mass index is 29.56 kg/m².    SpO2: 99 %       No intake or output data in the 24 hours ending 07/31/24 1152    Lines/Drains/Airways       Peripheral Intravenous Line  Duration                  Peripheral IV - Single Lumen 07/31/24 1121 18 G Left;Posterior Hand <1 day         Peripheral IV - Single Lumen 07/31/24 1127 18 G Right Antecubital <1 day                     Physical Exam  Vitals and nursing note reviewed.   Constitutional:       General: He is not in acute distress.     Appearance: Normal appearance. He is well-developed. He is not diaphoretic.      Comments: On supplemental O2   HENT:      Head: Normocephalic and atraumatic.   Eyes:      General:         Right eye: No discharge.         Left eye: No discharge.      Pupils: Pupils are equal, round, and reactive to light.   Cardiovascular:      Rate and Rhythm: Normal rate and regular rhythm.      Heart sounds: Normal heart sounds, S1 normal and S2 normal. No murmur heard.  Pulmonary:      Effort: Pulmonary effort is normal. No respiratory distress.   Abdominal:      General: There is no distension.   Musculoskeletal:      Right lower leg: No edema.      Left lower leg: No edema.   Skin:     General: Skin is warm and dry.      Findings: No erythema.   Neurological:      General: No focal deficit present.      Mental Status: He is alert and oriented to person, place, and time.   Psychiatric:         Mood and Affect: Mood normal.         Behavior: Behavior normal.          Significant Labs: CMP   Recent Labs   Lab 07/30/24  0853      K 4.1      CO2 28   GLU 79   BUN 22*   CREATININE 1.3   CALCIUM 10.4   PROT  7.4   ALBUMIN 4.2   BILITOT 0.6   ALKPHOS 109   AST 10   ALT 22   ANIONGAP 10   , CBC   Recent Labs   Lab 07/30/24  0853 07/31/24  1122   WBC 7.94 8.15   HGB 17.2 17.4   HCT 49.7 50.2    234   , Troponin   Recent Labs   Lab 07/30/24  0853   TROPONINI <0.006   , and All pertinent lab results from the last 24 hours have been reviewed.    Significant Imaging: Echocardiogram: Transthoracic echo (TTE) complete (Cupid Only): No results found for this or any previous visit. and EKG: Reviewed  Assessment and Plan:     Abnormal EKG  -See plan under MI    Tobacco abuse  -Cessation advised    Hyperlipidemia  -Statin    Hypertension  -Will adjust meds post-procedure    Myocardial infarction  -Presents with intermittent left-sided CP/pressure with recent worsening over past week, currently CP free during exam  -EKG yesterday with acute anterolateral MI/STEMI, repeat today with concerning ST-T changes/Q waves, delayed presentation/subacute MI  -ASA, heparin gtt, statin  -TTE ordered  -Urgent LHC today by Dr. Gonzalez. All risks, benefits, and treatment alternatives explained to patient in detail. All questions answered. He has agreed to proceed. Further rec's to follow.        VTE Risk Mitigation (From admission, onward)           Ordered     heparin (porcine) injection  As needed (PRN)         07/31/24 2985                    Keli Sams PA-C  Cardiology   [facility] - Cath Lab ([facility])

## 2024-07-31 NOTE — PLAN OF CARE
[facility] - Intensive Care (Hospital)  Initial Discharge Assessment       Primary Care Provider: Elvis Hein MD    Admission Diagnosis: Chest pain [R07.9]  ST elevation myocardial infarction (STEMI), unspecified artery [I21.3]    Admission Date: 7/31/2024  Expected Discharge Date:     Transition of Care Barriers: None    Payor: BLUE CROSS BLUE SHIELD / Plan: BCBS FEDERAL STANDARD / Product Type: PPO /     Extended Emergency Contact Information  Primary Emergency Contact: RohanNicolasa  Mobile Phone: [phone]  Relation: Friend  Preferred language: English   needed? No    Discharge Plan A: Home with family         [facility] - [place], [place] - [address]  [address]  [place] [address]  Phone: [phone] Fax: [phone]      Initial Assessment (most recent)       Adult Discharge Assessment - 07/31/24 1533          Discharge Assessment    Assessment Type Discharge Planning Assessment     Confirmed/corrected address, phone number and insurance Yes     Confirmed Demographics Correct on Facesheet     Source of Information patient     Communicated IRAIDA with patient/caregiver Date not available/Unable to determine     Reason For Admission STEMI     People in Home alone     Facility Arrived From: home     Do you expect to return to your current living situation? Yes     Do you have help at home or someone to help you manage your care at home? Yes     Who are your caregiver(s) and their phone number(s)? Nicolasa khanna     Prior to hospitilization cognitive status: Alert/Oriented     Current cognitive status: Alert/Oriented     Walking or Climbing Stairs Difficulty no     Dressing/Bathing Difficulty no     Home Accessibility stairs to enter home     Equipment Currently Used at Home none     Readmission within 30 days? No     Patient currently being followed by outpatient case management? No     Do you currently have service(s) that help you manage your care at home? No     Do  you take prescription medications? No     Do you have prescription coverage? Yes     Coverage commercial     Do you have any problems affording any of your prescribed medications? No     Is the patient taking medications as prescribed? yes     Who is going to help you get home at discharge? friend, Nicolasa     How do you get to doctors appointments? car, drives self     Are you on dialysis? No     Do you take coumadin? No     Discharge Plan A Home with family     Discharge Plan discussed with: Patient     Transition of Care Barriers None                   Anticipated DC dispo: home with family   Prior Level of Function: independent with ADLs   People in home: lives alone      Comments:  CM met with patient and friend at bedside to introduce role and discuss discharge planning. Nicolasa will be help at home and can provide transport at time of discharge. Confirmed demographics, insurance, and emergency contacts. CM discharge needs depends on hospital progress. CM will continue following to assist with other needs. Discharge plan has been determined by review of patient's clinical status, future medical and therapeutic needs, and coverage/benefits for post-acute care in coordination with multidisciplinary team members.

## 2024-07-31 NOTE — HPI
Mr. Nelson is a 49 year old male patient whose current medical conditions include long time tobacco abuse,  HTN, hyperlipidemia, prior CVA (by CT scan) who presented to [facility] ED today due to abnormal EKG. Patient recently had his work physical which included an EKG which resulted back with acute MI/STEMI yesterday. He was called and advised to come to the ED but did not show up until today as he was at work. He endorses intermittent bouts of substernal, left-sided chest pressure/tightness, ongoing for the past month o. Recently worsening over the past week. Reports pain radiates down his left arm at times. Previously thought was pain was due to prior traumatic fall he had many years ago. He denies any associated SOB, nausea, vomiting, palpitations, near syncope, or syncope. No prior CV history. Remote history of drug abuse (cocaine, marijuana) but denies any recent usage. States he established care with a new PCP in January and has been compliant with those medications. EKG reviewed, SR with anterolateral infarct---subacute with delayed presentation. C planned today by Dr. Gonzalez, further rec's to follow.

## 2024-07-31 NOTE — SUBJECTIVE & OBJECTIVE
Past Medical History:   Diagnosis Date    Chronic pain     associated with injury       Past Surgical History:   Procedure Laterality Date    BRAIN SURGERY      EYE SURGERY      SHOULDER SURGERY      post trauma fell 19 feet       Review of patient's allergies indicates:  No Known Allergies    Family History       Problem Relation (Age of Onset)    Cancer Father    Hypertension Mother    Peripheral vascular disease Mother          Tobacco Use    Smoking status: Every Day     Current packs/day: 2.00     Types: Cigarettes    Smokeless tobacco: Never   Substance and Sexual Activity    Alcohol use: No    Drug use: Yes    Sexual activity: Not on file         Review of Systems   All other systems reviewed and are negative.    Objective:     Vital Signs (Most Recent):  Temp: 97 °F (36.1 °C) (07/31/24 1319)  Pulse: 64 (07/31/24 1319)  Resp: (!) 25 (07/31/24 1319)  BP: (!) 142/81 (07/31/24 1334)  SpO2: 99 % (07/31/24 1319) Vital Signs (24h Range):  Temp:  [97 °F (36.1 °C)-97.9 °F (36.6 °C)] 97 °F (36.1 °C)  Pulse:  [64-80] 64  Resp:  [18-26] 25  SpO2:  [98 %-99 %] 99 %  BP: (108-142)/(69-81) 142/81     Weight: 85.6 kg (188 lb 11.4 oz)  Body mass index is 29.56 kg/m².    No intake or output data in the 24 hours ending 07/31/24 1343     Physical Exam  Vitals and nursing note reviewed.   Constitutional:       General: He is not in acute distress.     Comments: Ambulating around his room   HENT:      Head: Normocephalic and atraumatic.   Eyes:      General: No scleral icterus.     Extraocular Movements: Extraocular movements intact.      Conjunctiva/sclera: Conjunctivae normal.      Pupils: Pupils are equal, round, and reactive to light.   Cardiovascular:      Rate and Rhythm: Normal rate and regular rhythm.      Pulses: Normal pulses.      Heart sounds: No murmur heard.  Pulmonary:      Effort: Pulmonary effort is normal. No respiratory distress.      Breath sounds: No wheezing, rhonchi or rales.   Abdominal:      General:  Abdomen is flat. Bowel sounds are normal. There is no distension.      Palpations: Abdomen is soft.   Musculoskeletal:      Cervical back: Normal range of motion and neck supple. No rigidity or tenderness.      Right lower leg: No edema.      Left lower leg: No edema.   Neurological:      General: No focal deficit present.      Mental Status: He is alert. Mental status is at baseline.          Vents:  Oxygen Concentration (%): 28 (07/31/24 1127)    Lines/Drains/Airways       Peripheral Intravenous Line  Duration                  Peripheral IV - Single Lumen 07/31/24 1121 18 G Left;Posterior Hand <1 day         Peripheral IV - Single Lumen 07/31/24 1127 18 G Right Antecubital <1 day                    Significant Labs:    CBC/Anemia Profile:  Recent Labs   Lab 07/30/24  0853 07/31/24  1122   WBC 7.94 8.15   HGB 17.2 17.4   HCT 49.7 50.2    234   MCV 84 83   RDW 13.9 14.0        Chemistries:  Recent Labs   Lab 07/30/24  0853 07/31/24  1122    140   K 4.1 3.6    104   CO2 28 24   BUN 22* 30*   CREATININE 1.3 1.6*   CALCIUM 10.4 9.9   ALBUMIN 4.2 4.3   PROT 7.4 7.5   BILITOT 0.6 0.9   ALKPHOS 109 119   ALT 22 22   AST 10 13       All pertinent labs within the past 24 hours have been reviewed.    Significant Imaging:   I have reviewed all pertinent imaging results/findings within the past 24 hours.

## 2024-07-31 NOTE — Clinical Note
An airway assessment has been completed by . Attempted to contact respiratory services for more information on this matter but there was no answer. Will try again on Monday.

## 2024-07-31 NOTE — Clinical Note
The right groin and left radial was prepped. The site was prepped with ChloraPrep. The site was clipped. The patient was draped.

## 2024-07-31 NOTE — FIRST PROVIDER EVALUATION
Medical screening examination initiated.  I have conducted a focused provider triage encounter, findings are as follows:    Brief history of present illness:  presents for intermittent chest tightness     There were no vitals filed for this visit.    Pertinent physical exam:  nad    Brief workup plan:  labs, EKG, imaging, further eval    Preliminary workup initiated; this workup will be continued and followed by the physician or advanced practice provider that is assigned to the patient when roomed.

## 2024-07-31 NOTE — SUBJECTIVE & OBJECTIVE
Past Medical History:   Diagnosis Date    Chronic pain     associated with injury       Past Surgical History:   Procedure Laterality Date    BRAIN SURGERY      EYE SURGERY      SHOULDER SURGERY      post trauma fell 19 feet       Review of patient's allergies indicates:  No Known Allergies    No current facility-administered medications on file prior to encounter.     Current Outpatient Medications on File Prior to Encounter   Medication Sig    amLODIPine (NORVASC) 10 MG tablet Take 10 mg by mouth.    aspirin 81 MG Chew Take 81 mg by mouth.    atorvastatin (LIPITOR) 20 MG tablet Take 20 mg by mouth every evening.    MIEBO 100 % Drop Place 1 drop into both eyes 4 (four) times daily.    valsartan-hydrochlorothiazide (DIOVAN-HCT) 80-12.5 mg per tablet Take 1 tablet by mouth.    gabapentin (NEURONTIN) 600 MG tablet Take 600 mg by mouth.    levETIRAcetam (KEPPRA) 500 MG Tab Take 500 mg by mouth.    lisinopriL-hydrochlorothiazide (PRINZIDE,ZESTORETIC) 20-25 mg Tab Take 1 tablet by mouth once daily.    losartan (COZAAR) 25 MG tablet Take 1 tablet (25 mg total) by mouth once daily.    naloxone (NARCAN) 4 mg/actuation Spry 4mg by nasal route as needed for opioid overdose; may repeat every 2-3 minutes in alternating nostrils until medical help arrives. Call 911 (Patient not taking: Reported on 1/3/2024)    zolpidem (AMBIEN) 10 mg Tab Take 10 mg by mouth nightly as needed.     Family History       Problem Relation (Age of Onset)    Cancer Father    Hypertension Mother    Peripheral vascular disease Mother          Tobacco Use    Smoking status: Every Day     Current packs/day: 2.00     Types: Cigarettes    Smokeless tobacco: Never   Substance and Sexual Activity    Alcohol use: No    Drug use: Yes    Sexual activity: Not on file     Review of Systems   Constitutional: Positive for malaise/fatigue.   HENT: Negative.     Eyes: Negative.    Cardiovascular:  Positive for chest pain.   Respiratory: Negative.     Endocrine:  Negative.    Hematologic/Lymphatic: Negative.    Skin: Negative.    Musculoskeletal:  Positive for joint pain (L arm).   Gastrointestinal: Negative.    Genitourinary: Negative.    Neurological:  Positive for numbness (L arm).   Psychiatric/Behavioral: Negative.     Allergic/Immunologic: Negative.      Objective:     Vital Signs (Most Recent):  Temp: 97.9 °F (36.6 °C) (07/31/24 1102)  Pulse: 80 (07/31/24 1127)  Resp: (!) 26 (07/31/24 1127)  BP: 127/80 (07/31/24 1127)  SpO2: 99 % (07/31/24 1127) Vital Signs (24h Range):  Temp:  [97.9 °F (36.6 °C)] 97.9 °F (36.6 °C)  Pulse:  [80] 80  Resp:  [18-26] 26  SpO2:  [98 %-99 %] 99 %  BP: (108-127)/(70-80) 127/80     Weight: 85.6 kg (188 lb 11.4 oz)  Body mass index is 29.56 kg/m².    SpO2: 99 %       No intake or output data in the 24 hours ending 07/31/24 1152    Lines/Drains/Airways       Peripheral Intravenous Line  Duration                  Peripheral IV - Single Lumen 07/31/24 1121 18 G Left;Posterior Hand <1 day         Peripheral IV - Single Lumen 07/31/24 1127 18 G Right Antecubital <1 day                     Physical Exam  Vitals and nursing note reviewed.   Constitutional:       General: He is not in acute distress.     Appearance: Normal appearance. He is well-developed. He is not diaphoretic.      Comments: On supplemental O2   HENT:      Head: Normocephalic and atraumatic.   Eyes:      General:         Right eye: No discharge.         Left eye: No discharge.      Pupils: Pupils are equal, round, and reactive to light.   Cardiovascular:      Rate and Rhythm: Normal rate and regular rhythm.      Heart sounds: Normal heart sounds, S1 normal and S2 normal. No murmur heard.  Pulmonary:      Effort: Pulmonary effort is normal. No respiratory distress.   Abdominal:      General: There is no distension.   Musculoskeletal:      Right lower leg: No edema.      Left lower leg: No edema.   Skin:     General: Skin is warm and dry.      Findings: No erythema.   Neurological:       General: No focal deficit present.      Mental Status: He is alert and oriented to person, place, and time.   Psychiatric:         Mood and Affect: Mood normal.         Behavior: Behavior normal.          Significant Labs: CMP   Recent Labs   Lab 07/30/24  0853      K 4.1      CO2 28   GLU 79   BUN 22*   CREATININE 1.3   CALCIUM 10.4   PROT 7.4   ALBUMIN 4.2   BILITOT 0.6   ALKPHOS 109   AST 10   ALT 22   ANIONGAP 10   , CBC   Recent Labs   Lab 07/30/24  0853 07/31/24  1122   WBC 7.94 8.15   HGB 17.2 17.4   HCT 49.7 50.2    234   , Troponin   Recent Labs   Lab 07/30/24  0853   TROPONINI <0.006   , and All pertinent lab results from the last 24 hours have been reviewed.    Significant Imaging: Echocardiogram: Transthoracic echo (TTE) complete (Cupid Only): No results found for this or any previous visit. and EKG: Reviewed

## 2024-07-31 NOTE — HPI
Mr Leslie is a 50 y/o WM with ongoing tobacco dependence (1.5pk/day), HTN, HLD, and h/o noncompliance with meds who presented to the ER today on the prompting of his work due to abnormal EKG.  He says that he recently underwent a work physical and was told his EKG was abnormal.  Had a repeat and they told him go to the ER.  EKG shows STEMI and cath lab was activated emergently.  Pt reported on and off chest pain/pressure for about the last month.   Denies F/C, palpitations, diaphoresis, shortness of breath, change in diet, change in bowel/bladder, N/V.      LHC done by Dr Gonzalez showed and occluded LAD and LVEF of 25%.  LAD was treated with PCI.  Findings likely subacute given month long symptoms.

## 2024-08-01 PROBLEM — I25.5 ISCHEMIC CARDIOMYOPATHY: Status: ACTIVE | Noted: 2024-08-01

## 2024-08-01 LAB
ALBUMIN SERPL BCP-MCNC: 3.6 G/DL (ref 3.5–5.2)
ALP SERPL-CCNC: 110 U/L (ref 55–135)
ALT SERPL W/O P-5'-P-CCNC: 18 U/L (ref 10–44)
ANION GAP SERPL CALC-SCNC: 10 MMOL/L (ref 8–16)
AORTIC ROOT ANNULUS: 3.11 CM
ASCENDING AORTA: 3.73 CM
AST SERPL-CCNC: 11 U/L (ref 10–40)
AV INDEX (PROSTH): 0.79
AV MEAN GRADIENT: 5 MMHG
AV PEAK GRADIENT: 9 MMHG
AV VALVE AREA BY VELOCITY RATIO: 3.26 CM²
AV VALVE AREA: 3.09 CM²
AV VELOCITY RATIO: 0.83
BASOPHILS # BLD AUTO: 0.05 K/UL (ref 0–0.2)
BASOPHILS NFR BLD: 0.7 % (ref 0–1.9)
BILIRUB SERPL-MCNC: 0.6 MG/DL (ref 0.1–1)
BSA FOR ECHO PROCEDURE: 2.01 M2
BUN SERPL-MCNC: 22 MG/DL (ref 6–20)
CALCIUM SERPL-MCNC: 9.1 MG/DL (ref 8.7–10.5)
CHLORIDE SERPL-SCNC: 106 MMOL/L (ref 95–110)
CO2 SERPL-SCNC: 23 MMOL/L (ref 23–29)
CREAT SERPL-MCNC: 1 MG/DL (ref 0.5–1.4)
CV ECHO LV RWT: 0.46 CM
DIFFERENTIAL METHOD BLD: NORMAL
DOP CALC AO PEAK VEL: 1.46 M/S
DOP CALC AO VTI: 29.8 CM
DOP CALC LVOT AREA: 3.9 CM2
DOP CALC LVOT DIAMETER: 2.24 CM
DOP CALC LVOT PEAK VEL: 1.21 M/S
DOP CALC LVOT STROKE VOLUME: 92.17 CM3
DOP CALC RVOT PEAK VEL: 0.48 M/S
DOP CALC RVOT VTI: 12.7 CM
DOP CALCLVOT PEAK VEL VTI: 23.4 CM
E WAVE DECELERATION TIME: 168.15 MSEC
E/A RATIO: 0.6
E/E' RATIO: 8 M/S
ECHO LV POSTERIOR WALL: 1.22 CM (ref 0.6–1.1)
EJECTION FRACTION: 35 %
EOSINOPHIL # BLD AUTO: 0.2 K/UL (ref 0–0.5)
EOSINOPHIL NFR BLD: 3 % (ref 0–8)
ERYTHROCYTE [DISTWIDTH] IN BLOOD BY AUTOMATED COUNT: 13.8 % (ref 11.5–14.5)
EST. GFR  (NO RACE VARIABLE): >60 ML/MIN/1.73 M^2
FRACTIONAL SHORTENING: 29 % (ref 28–44)
GLUCOSE SERPL-MCNC: 100 MG/DL (ref 70–110)
HCT VFR BLD AUTO: 46.5 % (ref 40–54)
HGB BLD-MCNC: 16.3 G/DL (ref 14–18)
IMM GRANULOCYTES # BLD AUTO: 0.02 K/UL (ref 0–0.04)
IMM GRANULOCYTES NFR BLD AUTO: 0.3 % (ref 0–0.5)
INTERVENTRICULAR SEPTUM: 1.14 CM (ref 0.6–1.1)
IVC DIAMETER: 0.94 CM
IVRT: 117.98 MSEC
LA MAJOR: 5.71 CM
LA MINOR: 4.88 CM
LA WIDTH: 3.8 CM
LEFT ATRIUM SIZE: 3.29 CM
LEFT ATRIUM VOLUME INDEX: 28.4 ML/M2
LEFT ATRIUM VOLUME: 55.92 CM3
LEFT INTERNAL DIMENSION IN SYSTOLE: 3.81 CM (ref 2.1–4)
LEFT VENTRICLE DIASTOLIC VOLUME INDEX: 70.48 ML/M2
LEFT VENTRICLE DIASTOLIC VOLUME: 138.85 ML
LEFT VENTRICLE MASS INDEX: 130 G/M2
LEFT VENTRICLE SYSTOLIC VOLUME INDEX: 31.7 ML/M2
LEFT VENTRICLE SYSTOLIC VOLUME: 62.5 ML
LEFT VENTRICULAR INTERNAL DIMENSION IN DIASTOLE: 5.36 CM (ref 3.5–6)
LEFT VENTRICULAR MASS: 255.28 G
LV LATERAL E/E' RATIO: 8 M/S
LV SEPTAL E/E' RATIO: 8 M/S
LVED V (TEICH): 138.85 ML
LVES V (TEICH): 62.5 ML
LVOT MG: 2.63 MMHG
LVOT MV: 0.75 CM/S
LYMPHOCYTES # BLD AUTO: 1.8 K/UL (ref 1–4.8)
LYMPHOCYTES NFR BLD: 24.3 % (ref 18–48)
MCH RBC QN AUTO: 29.2 PG (ref 27–31)
MCHC RBC AUTO-ENTMCNC: 35.1 G/DL (ref 32–36)
MCV RBC AUTO: 83 FL (ref 82–98)
MONOCYTES # BLD AUTO: 0.6 K/UL (ref 0.3–1)
MONOCYTES NFR BLD: 7.4 % (ref 4–15)
MV PEAK A VEL: 0.93 M/S
MV PEAK E VEL: 0.56 M/S
NEUTROPHILS # BLD AUTO: 4.9 K/UL (ref 1.8–7.7)
NEUTROPHILS NFR BLD: 64.3 % (ref 38–73)
NRBC BLD-RTO: 0 /100 WBC
OHS QRS DURATION: 92 MS
OHS QTC CALCULATION: 441 MS
PISA TR MAX VEL: 1.92 M/S
PLATELET # BLD AUTO: 206 K/UL (ref 150–450)
PMV BLD AUTO: 9.8 FL (ref 9.2–12.9)
POCT GLUCOSE: 122 MG/DL (ref 70–110)
POCT GLUCOSE: 123 MG/DL (ref 70–110)
POTASSIUM SERPL-SCNC: 3.6 MMOL/L (ref 3.5–5.1)
PROT SERPL-MCNC: 5.9 G/DL (ref 6–8.4)
PV MEAN GRADIENT: 1 MMHG
RA MAJOR: 4.37 CM
RA PRESSURE ESTIMATED: 3 MMHG
RBC # BLD AUTO: 5.58 M/UL (ref 4.6–6.2)
RV TB RVSP: 5 MMHG
SODIUM SERPL-SCNC: 139 MMOL/L (ref 136–145)
STJ: 3.6 CM
TDI LATERAL: 0.07 M/S
TDI SEPTAL: 0.07 M/S
TDI: 0.07 M/S
TR MAX PG: 15 MMHG
TRICUSPID ANNULAR PLANE SYSTOLIC EXCURSION: 1.95 CM
TROPONIN I SERPL DL<=0.01 NG/ML-MCNC: 0.04 NG/ML (ref 0–0.03)
TV REST PULMONARY ARTERY PRESSURE: 18 MMHG
WBC # BLD AUTO: 7.56 K/UL (ref 3.9–12.7)
Z-SCORE OF LEFT VENTRICULAR DIMENSION IN END DIASTOLE: -0.55
Z-SCORE OF LEFT VENTRICULAR DIMENSION IN END SYSTOLE: 0.72

## 2024-08-01 PROCEDURE — 99233 SBSQ HOSP IP/OBS HIGH 50: CPT | Mod: ,,, | Performed by: INTERNAL MEDICINE

## 2024-08-01 PROCEDURE — 93010 ELECTROCARDIOGRAM REPORT: CPT | Mod: ,,, | Performed by: INTERNAL MEDICINE

## 2024-08-01 PROCEDURE — 25000003 PHARM REV CODE 250: Performed by: INTERNAL MEDICINE

## 2024-08-01 PROCEDURE — 11000001 HC ACUTE MED/SURG PRIVATE ROOM

## 2024-08-01 PROCEDURE — 84484 ASSAY OF TROPONIN QUANT: CPT | Performed by: INTERNAL MEDICINE

## 2024-08-01 PROCEDURE — 36415 COLL VENOUS BLD VENIPUNCTURE: CPT | Performed by: INTERNAL MEDICINE

## 2024-08-01 PROCEDURE — 93005 ELECTROCARDIOGRAM TRACING: CPT

## 2024-08-01 PROCEDURE — 80053 COMPREHEN METABOLIC PANEL: CPT | Performed by: INTERNAL MEDICINE

## 2024-08-01 PROCEDURE — 85025 COMPLETE CBC W/AUTO DIFF WBC: CPT | Performed by: INTERNAL MEDICINE

## 2024-08-01 PROCEDURE — 25000003 PHARM REV CODE 250

## 2024-08-01 PROCEDURE — S4991 NICOTINE PATCH NONLEGEND: HCPCS

## 2024-08-01 RX ORDER — ALPRAZOLAM 0.5 MG/1
0.5 TABLET ORAL 2 TIMES DAILY PRN
Status: DISCONTINUED | OUTPATIENT
Start: 2024-08-01 | End: 2024-08-02 | Stop reason: HOSPADM

## 2024-08-01 RX ORDER — POTASSIUM CHLORIDE 20 MEQ/1
40 TABLET, EXTENDED RELEASE ORAL ONCE
Status: COMPLETED | OUTPATIENT
Start: 2024-08-01 | End: 2024-08-01

## 2024-08-01 RX ADMIN — ALPRAZOLAM 0.5 MG: 0.5 TABLET ORAL at 08:08

## 2024-08-01 RX ADMIN — MUPIROCIN: 20 OINTMENT TOPICAL at 08:08

## 2024-08-01 RX ADMIN — PRASUGREL 10 MG: 10 TABLET, FILM COATED ORAL at 09:08

## 2024-08-01 RX ADMIN — ASPIRIN 81 MG: 81 TABLET, COATED ORAL at 09:08

## 2024-08-01 RX ADMIN — ATORVASTATIN CALCIUM 80 MG: 40 TABLET, FILM COATED ORAL at 08:08

## 2024-08-01 RX ADMIN — POTASSIUM CHLORIDE 40 MEQ: 1500 TABLET, EXTENDED RELEASE ORAL at 09:08

## 2024-08-01 RX ADMIN — SACUBITRIL AND VALSARTAN 1 TABLET: 24; 26 TABLET, FILM COATED ORAL at 08:08

## 2024-08-01 RX ADMIN — METOPROLOL TARTRATE 25 MG: 25 TABLET, FILM COATED ORAL at 08:08

## 2024-08-01 RX ADMIN — METOPROLOL TARTRATE 25 MG: 25 TABLET, FILM COATED ORAL at 09:08

## 2024-08-01 RX ADMIN — NICOTINE 1 PATCH: 14 PATCH, EXTENDED RELEASE TRANSDERMAL at 09:08

## 2024-08-01 RX ADMIN — SACUBITRIL AND VALSARTAN 1 TABLET: 24; 26 TABLET, FILM COATED ORAL at 09:08

## 2024-08-01 RX ADMIN — MUPIROCIN: 20 OINTMENT TOPICAL at 09:08

## 2024-08-01 NOTE — SUBJECTIVE & OBJECTIVE
Review of Systems   Constitutional: Negative.   HENT: Negative.     Eyes: Negative.    Cardiovascular: Negative.    Respiratory: Negative.     Endocrine: Negative.    Hematologic/Lymphatic: Negative.    Skin: Negative.    Musculoskeletal:  Positive for arthritis and joint pain.   Gastrointestinal: Negative.    Genitourinary: Negative.    Neurological: Negative.    Psychiatric/Behavioral: Negative.     Allergic/Immunologic: Negative.      Objective:     Vital Signs (Most Recent):  Temp: 98.4 °F (36.9 °C) (08/01/24 1215)  Pulse: 68 (08/01/24 1215)  Resp: (!) 41 (08/01/24 1215)  BP: 136/85 (08/01/24 1215)  SpO2: 100 % (08/01/24 1215) Vital Signs (24h Range):  Temp:  [97 °F (36.1 °C)-98.4 °F (36.9 °C)] 98.4 °F (36.9 °C)  Pulse:  [52-83] 68  Resp:  [10-61] 41  SpO2:  [96 %-100 %] 100 %  BP: (108-158)/() 136/85     Weight: 85.3 kg (188 lb)  Body mass index is 29.44 kg/m².     SpO2: 100 %         Intake/Output Summary (Last 24 hours) at 8/1/2024 1252  Last data filed at 8/1/2024 1000  Gross per 24 hour   Intake 2141.23 ml   Output 300 ml   Net 1841.23 ml       Lines/Drains/Airways       Peripheral Intravenous Line  Duration                  Peripheral IV - Single Lumen 07/31/24 1121 18 G Left;Posterior Hand 1 day         Peripheral IV - Single Lumen 07/31/24 1127 18 G Right Antecubital 1 day                       Physical Exam  Vitals and nursing note reviewed.   Constitutional:       General: He is not in acute distress.     Appearance: Normal appearance. He is well-developed. He is not diaphoretic.   HENT:      Head: Normocephalic and atraumatic.   Eyes:      General:         Right eye: No discharge.         Left eye: No discharge.      Pupils: Pupils are equal, round, and reactive to light.   Cardiovascular:      Rate and Rhythm: Normal rate and regular rhythm.      Heart sounds: Normal heart sounds, S1 normal and S2 normal. No murmur heard.  Pulmonary:      Effort: Pulmonary effort is normal. No respiratory  distress.      Breath sounds: Normal breath sounds.   Abdominal:      General: There is no distension.   Musculoskeletal:      Right lower leg: No edema.      Left lower leg: No edema.   Skin:     General: Skin is warm and dry.      Findings: No erythema.      Comments: L radial access site C/D/I; no bleeding erythema or drainage, intact pulse   Neurological:      General: No focal deficit present.      Mental Status: He is alert and oriented to person, place, and time.   Psychiatric:         Mood and Affect: Mood normal.         Behavior: Behavior normal.            Significant Labs: CMP   Recent Labs   Lab 07/31/24  1122 08/01/24  0331    139   K 3.6 3.6    106   CO2 24 23   GLU 98 100   BUN 30* 22*   CREATININE 1.6* 1.0   CALCIUM 9.9 9.1   PROT 7.5 5.9*   ALBUMIN 4.3 3.6   BILITOT 0.9 0.6   ALKPHOS 119 110   AST 13 11   ALT 22 18   ANIONGAP 12 10   , CBC   Recent Labs   Lab 07/31/24  1122 08/01/24  0331   WBC 8.15 7.56   HGB 17.4 16.3   HCT 50.2 46.5    206   , Troponin   Recent Labs   Lab 07/31/24  1320 07/31/24  1917 08/01/24  0117   TROPONINI 0.016 0.023 0.045*   , and All pertinent lab results from the last 24 hours have been reviewed.    Significant Imaging: Echocardiogram: Transthoracic echo (TTE) complete (Cupid Only):   Results for orders placed or performed during the hospital encounter of 07/31/24   Echo   Result Value Ref Range    BSA 2.01 m2    LA WIDTH 3.8 cm    LVOT stroke volume 92.17 cm3    LVIDd 5.36 3.5 - 6.0 cm    LV Systolic Volume 62.50 mL    LV Systolic Volume Index 31.7 mL/m2    LVIDs 3.81 2.1 - 4.0 cm    LV Diastolic Volume 138.85 mL    LV Diastolic Volume Index 70.48 mL/m2    Left Ventricular End Systolic Volume by Teichholz Method 62.50 mL    Left Ventricular End Diastolic Volume by Teichholz Method 138.85 mL    IVS 1.14 (A) 0.6 - 1.1 cm    LVOT diameter 2.24 cm    LVOT area 3.9 cm2    FS 29 28 - 44 %    Left Ventricle Relative Wall Thickness 0.46 cm    Posterior Wall  1.22 (A) 0.6 - 1.1 cm    LV mass 255.28 g    LV Mass Index 130 g/m2    MV Peak E Martin 0.56 m/s    TDI LATERAL 0.07 m/s    TDI SEPTAL 0.07 m/s    E/E' ratio 8.00 m/s    MV Peak A Martin 0.93 m/s    TR Max Martin 1.92 m/s    E/A ratio 0.60     IVRT 117.98 msec    E wave deceleration time 168.15 msec    LV SEPTAL E/E' RATIO 8.00 m/s    LA Volume Index 28.4 mL/m2    LV LATERAL E/E' RATIO 8.00 m/s    LA volume 55.92 cm3    LVOT peak martin 1.21 m/s    Left Ventricular Outflow Tract Mean Velocity 0.75 cm/s    Left Ventricular Outflow Tract Mean Gradient 2.63 mmHg    RVOT peak VTI 12.7 cm    TAPSE 1.95 cm    LA size 3.29 cm    Left Atrium Minor Axis 4.88 cm    Left Atrium Major Axis 5.71 cm    RA Major Axis 4.37 cm    AV mean gradient 5 mmHg    AV peak gradient 9 mmHg    Ao peak martin 1.46 m/s    Ao VTI 29.80 cm    LVOT peak VTI 23.40 cm    AV valve area 3.09 cm²    AV Velocity Ratio 0.83     AV index (prosthetic) 0.79     SHAYY by Velocity Ratio 3.26 cm²    Triscuspid Valve Regurgitation Peak Gradient 15 mmHg    PV mean gradient 1 mmHg    PV peak gradient 1     RVOT peak martin 0.48 m/s    Ao root annulus 3.11 cm    STJ 3.60 cm    Ascending aorta 3.73 cm    IVC diameter 0.94 cm    Mean e' 0.07 m/s    ZLVIDS 0.72     ZLVIDD -0.55     and EKG: Reviewed

## 2024-08-01 NOTE — ASSESSMENT & PLAN NOTE
Chronic, controlled. Latest blood pressure and vitals reviewed-     Temp:  [97 °F (36.1 °C)-98.2 °F (36.8 °C)]   Pulse:  [52-83]   Resp:  [10-61]   BP: (108-142)/(55-95)   SpO2:  [96 %-100 %] .   Home meds for hypertension were reviewed and noted below.   Hypertension Medications               amLODIPine (NORVASC) 10 MG tablet Take 10 mg by mouth.    valsartan-hydrochlorothiazide (DIOVAN-HCT) 80-12.5 mg per tablet Take 1 tablet by mouth.    lisinopriL-hydrochlorothiazide (PRINZIDE,ZESTORETIC) 20-25 mg Tab Take 1 tablet by mouth once daily.    losartan (COZAAR) 25 MG tablet Take 1 tablet (25 mg total) by mouth once daily.            While in the hospital, will manage blood pressure as follows; metoprolol, titrate as needed    Will utilize p.r.n. blood pressure medication only if patient's blood pressure greater than 180/110 and he develops symptoms such as worsening chest pain or shortness of breath.

## 2024-08-01 NOTE — PLAN OF CARE
Pt a&0x4, ambulates without assistance. SR/SB on monitor. HR as low as 48 while resting. Dr. Gonzalez aware. Present for POC review with Dr. Gonzalez. Pt anxious to return home but understand need for monitoring. Tele status. Remains on monitor. Voiding per urinal. Fluids DC's. Radial site monitored and WNL.

## 2024-08-01 NOTE — ASSESSMENT & PLAN NOTE
-Presents with intermittent left-sided CP/pressure with recent worsening over past week, currently PC free during exam  -EKG yesterday with acute anterolateral MI/STEMI, repeat today with concerning ST-T changes/Q waves, delayed presentation  -ASA, heparin gtt, statin  -TTE ordered  -Urgent LHC today by Dr. Gonzalez. All risks, benefits, and treatment alternatives explained to patient in detail. All questions answered. He has agreed to proceed. Further re'cs to follow.    8/1/24  -s/p LHC with PCI of LAD  -Stable this AM, CP free  -Continue ASA, Effient, BB, statin  -Entresto initiated  -Needs LifeVest for SCD prevention  -Transfer to telemetry

## 2024-08-01 NOTE — CONSULTS
Food & Nutrition  Education    Diet Education: Cardiac Education  Time Spent: RD remote  Learners: Patient       Nutrition Education provided with handouts:   + Clinical Reference attachments to d/c documents    Nutrition Related Social Determinants of Health: SDOH: Unable to assess at this time.     Comments:  Patient is on a cardiac diet.  Patient admitted with myocardial infarction diagnosis.  Labs reviewed.  ESPERANZA.  LBM:7/31/2024   I/O since admit +711mls.  Education handouts attached to discharge paperwork.  RD to continue to monitor po intake and encourage po intake of recommended oral diet.     Patient Active Problem List   Diagnosis    Upper respiratory tract infection    Bronchitis    Fever    Myocardial infarction    Hypertension    Hyperlipidemia    Tobacco abuse    Abnormal EKG     Past Medical History:   Diagnosis Date    Chronic pain     associated with injury     BMP  Lab Results   Component Value Date     08/01/2024    K 3.6 08/01/2024     08/01/2024    CO2 23 08/01/2024    BUN 22 (H) 08/01/2024    CREATININE 1.0 08/01/2024    CALCIUM 9.1 08/01/2024    ANIONGAP 10 08/01/2024    EGFRNORACEVR >60 08/01/2024     Lab Results   Component Value Date    HGBA1C 5.2 07/30/2024     Lab Results   Component Value Date    CALCIUM 9.1 08/01/2024    PHOS 3.6 04/13/2019       Scheduled Meds:   aspirin  81 mg Oral Daily    atorvastatin  80 mg Oral QHS    metoprolol tartrate  25 mg Oral BID    mupirocin   Nasal BID    nicotine  1 patch Transdermal Daily    prasugreL  10 mg Oral Daily     Continuous Infusions:   0.9% NaCl   Intravenous Continuous 75 mL/hr at 07/31/24 1900 Rate Verify at 07/31/24 1900     PRN Meds:.  Current Facility-Administered Medications:     hydrALAZINE, 10 mg, Intravenous, Q4H PRN    HYDROcodone-acetaminophen, 1 tablet, Oral, Q4H PRN    morphine, 3 mg, Intravenous, Q4H PRN    ondansetron, 8 mg, Oral, Q8H PRN       All questions and concerns answered. Dietitian's contact information  provided.       Follow-Up: Yes     Please Re-consult as needed        Thanks  Delia Ge, MS, RDN, LDN

## 2024-08-01 NOTE — HOSPITAL COURSE
8/1/24-Patient seen and examined today, s/p [facility] yesterday with PCI of LAD. EF noted to be 25%. Feels well. No CP or SOB. No CV complaints. Anxious to be discharged home. Labs reviewed. LifeVest ordered for SCD prevention. Ok to transfer to telemetry.    8/2/24-Patient seen and examined today, ambulating in room. Feels well. No CP, wants to go home. Labs reviewed. Awaiting LifeVest.

## 2024-08-01 NOTE — PROGRESS NOTES
[facility] ([facility])  Critical Care Medicine  Progress Note    Patient Name: Toño Nelson  MRN: [id number]  Admission Date: 7/31/2024  Hospital Length of Stay: 1 days  Code Status: No Order  Attending Provider: Josh Gonzalez MD  Primary Care Provider: Elvis Hein MD   Principal Problem: <principal problem not specified>    Subjective:     HPI:  Mr Nelson is a 48 y/o WM with ongoing tobacco dependence (1.5pk/day), HTN, HLD, and h/o noncompliance with meds who presented to the ER today on the prompting of his work due to abnormal EKG.  He says that he recently underwent a work physical and was told his EKG was abnormal.  Had a repeat and they told him go to the ER.  EKG shows STEMI and cath lab was activated emergently.  Pt reported on and off chest pain/pressure for about the last month.   Denies F/C, palpitations, diaphoresis, shortness of breath, change in diet, change in bowel/bladder, N/V.      LHC done by Dr Gonzalez showed and occluded LAD and LVEF of 25%.  LAD was treated with PCI.  Findings likely subacute given month long symptoms.    Hospital/ICU Course:  No notes on file    Interval History: No acute events.  Up and ambulating in the room.  No chest pain/dyspnea.  Patient is anxious to go home.      Objective:     Vital Signs (Most Recent):  Temp: 98.2 °F (36.8 °C) (08/01/24 0305)  Pulse: (!) 58 (08/01/24 0415)  Resp: 16 (08/01/24 0415)  BP: (!) 142/76 (08/01/24 0400)  SpO2: 100 % (08/01/24 0415) Vital Signs (24h Range):  Temp:  [97 °F (36.1 °C)-98.2 °F (36.8 °C)] 98.2 °F (36.8 °C)  Pulse:  [52-83] 58  Resp:  [10-61] 16  SpO2:  [96 %-100 %] 100 %  BP: (108-142)/(55-95) 142/76     Weight: 85.6 kg (188 lb 11.4 oz)  Body mass index is 29.56 kg/m².      Intake/Output Summary (Last 24 hours) at 8/1/2024 0830  Last data filed at 7/31/2024 1900  Gross per 24 hour   Intake 1010.58 ml   Output 300 ml   Net 710.58 ml        Physical Exam  Vitals and nursing note reviewed.  "  Constitutional:       General: He is not in acute distress.     Comments: Ambulating around the room   Cardiovascular:      Rate and Rhythm: Normal rate and regular rhythm.      Pulses: Normal pulses.      Heart sounds: No murmur heard.  Pulmonary:      Effort: Pulmonary effort is normal. No respiratory distress.      Breath sounds: No rhonchi or rales.   Abdominal:      General: Abdomen is flat. There is no distension.      Palpations: Abdomen is soft.      Tenderness: There is no abdominal tenderness.   Musculoskeletal:      Right lower leg: No edema.      Left lower leg: No edema.   Neurological:      General: No focal deficit present.      Mental Status: He is alert. Mental status is at baseline.           Review of Systems    Vents:  Oxygen Concentration (%): 28 (07/31/24 1127)    Lines/Drains/Airways       Peripheral Intravenous Line  Duration                  Peripheral IV - Single Lumen 07/31/24 1121 18 G Left;Posterior Hand <1 day         Peripheral IV - Single Lumen 07/31/24 1127 18 G Right Antecubital <1 day                    Significant Labs:    CBC/Anemia Profile:  Recent Labs   Lab 07/30/24  0853 07/31/24  1122 08/01/24  0331   WBC 7.94 8.15 7.56   HGB 17.2 17.4 16.3   HCT 49.7 50.2 46.5    234 206   MCV 84 83 83   RDW 13.9 14.0 13.8        Chemistries:  Recent Labs   Lab 07/30/24  0853 07/31/24  1122 08/01/24  0331    140 139   K 4.1 3.6 3.6    104 106   CO2 28 24 23   BUN 22* 30* 22*   CREATININE 1.3 1.6* 1.0   CALCIUM 10.4 9.9 9.1   ALBUMIN 4.2 4.3 3.6   PROT 7.4 7.5 5.9*   BILITOT 0.6 0.9 0.6   ALKPHOS 109 119 110   ALT 22 22 18   AST 10 13 11       All pertinent labs within the past 24 hours have been reviewed.    Significant Imaging:  I have reviewed all pertinent imaging results/findings within the past 24 hours.    ABG  No results for input(s): "PH", "PO2", "PCO2", "HCO3", "BE" in the last 168 hours.  Assessment/Plan:     Cardiac/Vascular  Hyperlipidemia  - " statin      Hypertension  Chronic, controlled. Latest blood pressure and vitals reviewed-     Temp:  [97 °F (36.1 °C)-98.2 °F (36.8 °C)]   Pulse:  [52-83]   Resp:  [10-61]   BP: (108-142)/(55-95)   SpO2:  [96 %-100 %] .   Home meds for hypertension were reviewed and noted below.   Hypertension Medications               amLODIPine (NORVASC) 10 MG tablet Take 10 mg by mouth.    valsartan-hydrochlorothiazide (DIOVAN-HCT) 80-12.5 mg per tablet Take 1 tablet by mouth.    lisinopriL-hydrochlorothiazide (PRINZIDE,ZESTORETIC) 20-25 mg Tab Take 1 tablet by mouth once daily.    losartan (COZAAR) 25 MG tablet Take 1 tablet (25 mg total) by mouth once daily.            While in the hospital, will manage blood pressure as follows; metoprolol, titrate as needed    Will utilize p.r.n. blood pressure medication only if patient's blood pressure greater than 180/110 and he develops symptoms such as worsening chest pain or shortness of breath.    Myocardial infarction  - s/p [facility] with Dr Gonzalez 7/31 with PCI to occluded LAD  - ASA/statin  - completed Aggrastat infusion x 12 hrs  - Effient  - consulted to dietician for education  - cardiac monitoring    Other  Tobacco abuse  - encouraged cessation  - patient expresses no wish to quit at this time  - declined nicotine patch      Defer dispo to primary team.     Cory Lopez MD  Critical Care Medicine  [facility] ([facility])

## 2024-08-01 NOTE — PLAN OF CARE
Pt AAOX4, SR, normotensive, on room air. Aggrastat infusing. Pt upadlib and changed position independently without event or complaints. Safety precautions implemented and enforced with call light and personal belongings within reach.

## 2024-08-01 NOTE — PROGRESS NOTES
[facility] ([facility])  Cardiology  Progress Note    Patient Name: Toño Nelson  MRN: [id number]  Admission Date: 7/31/2024  Hospital Length of Stay: 1 days  Code Status: No Order   Attending Physician: Josh Gonzalez MD   Primary Care Physician: Elvis Hein MD  Expected Discharge Date:   Principal Problem:<principal problem not specified>    Subjective:   HPI:  Mr. Nelson is a 49 year old male patient whose current medical conditions include long time tobacco abuse, HTN, hyperlipidemia, prior CVA (by CT scan) who presented to [facility] ED today due to abnormal EKG. Patient recently had his work physical which included an EKG which resulted back with acute MI/STEMI yesterday. He was called and advised to come to the ED but did not show up until today as he was at work. He endorses intermittent bouts of substernal, left-sided chest pressure/tightness, ongoing for the past month o. Recently worsening over the past week. Reports pain radiates down his left arm at times. Previously thought was pain was due to prior traumatic fall he had many years ago. He denies any associated SOB, nausea, vomiting, palpitations, near syncope, or syncope. No prior CV history. Remote history of drug abuse (cocaine, marijuana) but denies any recent usage. States he established care with a new PCP in January and has been compliant with those medications. EKG reviewed, SR with anterolateral infarct---subacute with delayed presentation. [facility] planned today by Dr. Gonzalez, further rec's to follow.     Hospital Course:   8/1/24-Patient seen and examined today, s/p C yesterday with PCI of LAD. EF noted to be 25%. Feels well. No CP or SOB. No CV complaints. Anxious to be discharged home. Labs reviewed. LifeVest ordered for SCD prevention. Ok to transfer to telemetry.        Review of Systems   Constitutional: Negative.   HENT: Negative.     Eyes: Negative.    Cardiovascular: Negative.    Respiratory: Negative.      Endocrine: Negative.    Hematologic/Lymphatic: Negative.    Skin: Negative.    Musculoskeletal:  Positive for arthritis and joint pain.   Gastrointestinal: Negative.    Genitourinary: Negative.    Neurological: Negative.    Psychiatric/Behavioral: Negative.     Allergic/Immunologic: Negative.      Objective:     Vital Signs (Most Recent):  Temp: 98.4 °F (36.9 °C) (08/01/24 1215)  Pulse: 68 (08/01/24 1215)  Resp: (!) 41 (08/01/24 1215)  BP: 136/85 (08/01/24 1215)  SpO2: 100 % (08/01/24 1215) Vital Signs (24h Range):  Temp:  [97 °F (36.1 °C)-98.4 °F (36.9 °C)] 98.4 °F (36.9 °C)  Pulse:  [52-83] 68  Resp:  [10-61] 41  SpO2:  [96 %-100 %] 100 %  BP: (108-158)/() 136/85     Weight: 85.3 kg (188 lb)  Body mass index is 29.44 kg/m².     SpO2: 100 %         Intake/Output Summary (Last 24 hours) at 8/1/2024 1252  Last data filed at 8/1/2024 1000  Gross per 24 hour   Intake 2141.23 ml   Output 300 ml   Net 1841.23 ml       Lines/Drains/Airways       Peripheral Intravenous Line  Duration                  Peripheral IV - Single Lumen 07/31/24 1121 18 G Left;Posterior Hand 1 day         Peripheral IV - Single Lumen 07/31/24 1127 18 G Right Antecubital 1 day                       Physical Exam  Vitals and nursing note reviewed.   Constitutional:       General: He is not in acute distress.     Appearance: Normal appearance. He is well-developed. He is not diaphoretic.   HENT:      Head: Normocephalic and atraumatic.   Eyes:      General:         Right eye: No discharge.         Left eye: No discharge.      Pupils: Pupils are equal, round, and reactive to light.   Cardiovascular:      Rate and Rhythm: Normal rate and regular rhythm.      Heart sounds: Normal heart sounds, S1 normal and S2 normal. No murmur heard.  Pulmonary:      Effort: Pulmonary effort is normal. No respiratory distress.      Breath sounds: Normal breath sounds.   Abdominal:      General: There is no distension.   Musculoskeletal:      Right lower leg: No  edema.      Left lower leg: No edema.   Skin:     General: Skin is warm and dry.      Findings: No erythema.      Comments: L radial access site C/D/I; no bleeding erythema or drainage, intact pulse   Neurological:      General: No focal deficit present.      Mental Status: He is alert and oriented to person, place, and time.   Psychiatric:         Mood and Affect: Mood normal.         Behavior: Behavior normal.            Significant Labs: CMP   Recent Labs   Lab 07/31/24  1122 08/01/24  0331    139   K 3.6 3.6    106   CO2 24 23   GLU 98 100   BUN 30* 22*   CREATININE 1.6* 1.0   CALCIUM 9.9 9.1   PROT 7.5 5.9*   ALBUMIN 4.3 3.6   BILITOT 0.9 0.6   ALKPHOS 119 110   AST 13 11   ALT 22 18   ANIONGAP 12 10   , CBC   Recent Labs   Lab 07/31/24  1122 08/01/24  0331   WBC 8.15 7.56   HGB 17.4 16.3   HCT 50.2 46.5    206   , Troponin   Recent Labs   Lab 07/31/24  1320 07/31/24  1917 08/01/24  0117   TROPONINI 0.016 0.023 0.045*   , and All pertinent lab results from the last 24 hours have been reviewed.    Significant Imaging: Echocardiogram: Transthoracic echo (TTE) complete (Cupid Only):   Results for orders placed or performed during the hospital encounter of 07/31/24   Echo   Result Value Ref Range    BSA 2.01 m2    LA WIDTH 3.8 cm    LVOT stroke volume 92.17 cm3    LVIDd 5.36 3.5 - 6.0 cm    LV Systolic Volume 62.50 mL    LV Systolic Volume Index 31.7 mL/m2    LVIDs 3.81 2.1 - 4.0 cm    LV Diastolic Volume 138.85 mL    LV Diastolic Volume Index 70.48 mL/m2    Left Ventricular End Systolic Volume by Teichholz Method 62.50 mL    Left Ventricular End Diastolic Volume by Teichholz Method 138.85 mL    IVS 1.14 (A) 0.6 - 1.1 cm    LVOT diameter 2.24 cm    LVOT area 3.9 cm2    FS 29 28 - 44 %    Left Ventricle Relative Wall Thickness 0.46 cm    Posterior Wall 1.22 (A) 0.6 - 1.1 cm    LV mass 255.28 g    LV Mass Index 130 g/m2    MV Peak E Martin 0.56 m/s    TDI LATERAL 0.07 m/s    TDI SEPTAL 0.07 m/s     E/E' ratio 8.00 m/s    MV Peak A Martin 0.93 m/s    TR Max Martin 1.92 m/s    E/A ratio 0.60     IVRT 117.98 msec    E wave deceleration time 168.15 msec    LV SEPTAL E/E' RATIO 8.00 m/s    LA Volume Index 28.4 mL/m2    LV LATERAL E/E' RATIO 8.00 m/s    LA volume 55.92 cm3    LVOT peak martin 1.21 m/s    Left Ventricular Outflow Tract Mean Velocity 0.75 cm/s    Left Ventricular Outflow Tract Mean Gradient 2.63 mmHg    RVOT peak VTI 12.7 cm    TAPSE 1.95 cm    LA size 3.29 cm    Left Atrium Minor Axis 4.88 cm    Left Atrium Major Axis 5.71 cm    RA Major Axis 4.37 cm    AV mean gradient 5 mmHg    AV peak gradient 9 mmHg    Ao peak martin 1.46 m/s    Ao VTI 29.80 cm    LVOT peak VTI 23.40 cm    AV valve area 3.09 cm²    AV Velocity Ratio 0.83     AV index (prosthetic) 0.79     SHAYY by Velocity Ratio 3.26 cm²    Triscuspid Valve Regurgitation Peak Gradient 15 mmHg    PV mean gradient 1 mmHg    PV peak gradient 1     RVOT peak martin 0.48 m/s    Ao root annulus 3.11 cm    STJ 3.60 cm    Ascending aorta 3.73 cm    IVC diameter 0.94 cm    Mean e' 0.07 m/s    ZLVIDS 0.72     ZLVIDD -0.55     and EKG: Reviewed  Assessment and Plan:   Patient who presents with subacute MI, s/p LHC yesterday with LAD intervention. Stable CV wise. Meds being optimized. Needs LifeVest for SCD prevention.    Ischemic cardiomyopathy  -EF 25% with WMA, s/p LAD intervention  -Continue BB, Entresto  -LifeVest for SCD prevention    Abnormal EKG  -See plan under MI    Tobacco abuse  -Cessation advised    Hyperlipidemia  -Statin    Hypertension  -Will adjust meds post-procedure    8/1/24  -Continue BB, Entresto    Myocardial infarction  -Presents with intermittent left-sided CP/pressure with recent worsening over past week, currently PC free during exam  -EKG yesterday with acute anterolateral MI/STEMI, repeat today with concerning ST-T changes/Q waves, delayed presentation  -ASA, heparin gtt, statin  -TTE ordered  -Urgent C today by Dr. Gonzalez. All risks, benefits,  and treatment alternatives explained to patient in detail. All questions answered. He has agreed to proceed. Further re'cs to follow.    8/1/24  -s/p C with PCI of LAD  -Stable this AM, CP free  -Continue ASA, Effient, BB, statin  -Entresto initiated  -Needs LifeVest for SCD prevention  -Transfer to telemetry        VTE Risk Mitigation (From admission, onward)      None            Keli Sams PA-C  Cardiology  [facility] - Intensive [facility] (Hospital)

## 2024-08-01 NOTE — CONSULTS
Liftvest order and clinicals sent to [facility] for review.     1609: Lifevest approved and patient will be fit tomorrow.

## 2024-08-01 NOTE — ASSESSMENT & PLAN NOTE
- s/p LHC with Dr Gonzalez 7/31 with PCI to occluded LAD  - ASA/statin  - completed Aggrastat infusion x 12 hrs  - Effient  - consulted to dietician for education  - cardiac monitoring

## 2024-08-01 NOTE — SUBJECTIVE & OBJECTIVE
Interval History: No acute events.  Up and ambulating in the room.  No chest pain/dyspnea.  Patient is anxious to go home.      Objective:     Vital Signs (Most Recent):  Temp: 98.2 °F (36.8 °C) (08/01/24 0305)  Pulse: (!) 58 (08/01/24 0415)  Resp: 16 (08/01/24 0415)  BP: (!) 142/76 (08/01/24 0400)  SpO2: 100 % (08/01/24 0415) Vital Signs (24h Range):  Temp:  [97 °F (36.1 °C)-98.2 °F (36.8 °C)] 98.2 °F (36.8 °C)  Pulse:  [52-83] 58  Resp:  [10-61] 16  SpO2:  [96 %-100 %] 100 %  BP: (108-142)/(55-95) 142/76     Weight: 85.6 kg (188 lb 11.4 oz)  Body mass index is 29.56 kg/m².      Intake/Output Summary (Last 24 hours) at 8/1/2024 0830  Last data filed at 7/31/2024 1900  Gross per 24 hour   Intake 1010.58 ml   Output 300 ml   Net 710.58 ml        Physical Exam  Vitals and nursing note reviewed.   Constitutional:       General: He is not in acute distress.     Comments: Ambulating around the room   Cardiovascular:      Rate and Rhythm: Normal rate and regular rhythm.      Pulses: Normal pulses.      Heart sounds: No murmur heard.  Pulmonary:      Effort: Pulmonary effort is normal. No respiratory distress.      Breath sounds: No rhonchi or rales.   Abdominal:      General: Abdomen is flat. There is no distension.      Palpations: Abdomen is soft.      Tenderness: There is no abdominal tenderness.   Musculoskeletal:      Right lower leg: No edema.      Left lower leg: No edema.   Neurological:      General: No focal deficit present.      Mental Status: He is alert. Mental status is at baseline.           Review of Systems    Vents:  Oxygen Concentration (%): 28 (07/31/24 1127)    Lines/Drains/Airways       Peripheral Intravenous Line  Duration                  Peripheral IV - Single Lumen 07/31/24 1121 18 G Left;Posterior Hand <1 day         Peripheral IV - Single Lumen 07/31/24 1127 18 G Right Antecubital <1 day                    Significant Labs:    CBC/Anemia Profile:  Recent Labs   Lab 07/30/24  0853 07/31/24  1122  08/01/24  0331   WBC 7.94 8.15 7.56   HGB 17.2 17.4 16.3   HCT 49.7 50.2 46.5    234 206   MCV 84 83 83   RDW 13.9 14.0 13.8        Chemistries:  Recent Labs   Lab 07/30/24  0853 07/31/24  1122 08/01/24  0331    140 139   K 4.1 3.6 3.6    104 106   CO2 28 24 23   BUN 22* 30* 22*   CREATININE 1.3 1.6* 1.0   CALCIUM 10.4 9.9 9.1   ALBUMIN 4.2 4.3 3.6   PROT 7.4 7.5 5.9*   BILITOT 0.6 0.9 0.6   ALKPHOS 109 119 110   ALT 22 22 18   AST 10 13 11       All pertinent labs within the past 24 hours have been reviewed.    Significant Imaging:  I have reviewed all pertinent imaging results/findings within the past 24 hours.

## 2024-08-02 ENCOUNTER — TELEPHONE (OUTPATIENT)
Dept: CARDIOLOGY | Facility: HOSPITAL | Age: 49
End: 2024-08-02
Payer: COMMERCIAL

## 2024-08-02 VITALS
SYSTOLIC BLOOD PRESSURE: 139 MMHG | HEART RATE: 54 BPM | BODY MASS INDEX: 29.51 KG/M2 | WEIGHT: 188 LBS | HEIGHT: 67 IN | DIASTOLIC BLOOD PRESSURE: 91 MMHG | RESPIRATION RATE: 19 BRPM | TEMPERATURE: 98 F | OXYGEN SATURATION: 100 %

## 2024-08-02 PROBLEM — I21.02 ST ELEVATION MYOCARDIAL INFARCTION INVOLVING LEFT ANTERIOR DESCENDING (LAD) CORONARY ARTERY: Status: ACTIVE | Noted: 2024-07-31

## 2024-08-02 LAB
ALBUMIN SERPL BCP-MCNC: 3.9 G/DL (ref 3.5–5.2)
ALP SERPL-CCNC: 129 U/L (ref 55–135)
ALT SERPL W/O P-5'-P-CCNC: 22 U/L (ref 10–44)
ANION GAP SERPL CALC-SCNC: 7 MMOL/L (ref 8–16)
AST SERPL-CCNC: 12 U/L (ref 10–40)
BASOPHILS # BLD AUTO: 0.06 K/UL (ref 0–0.2)
BASOPHILS NFR BLD: 0.8 % (ref 0–1.9)
BILIRUB SERPL-MCNC: 0.7 MG/DL (ref 0.1–1)
BUN SERPL-MCNC: 17 MG/DL (ref 6–20)
CALCIUM SERPL-MCNC: 9.3 MG/DL (ref 8.7–10.5)
CHLORIDE SERPL-SCNC: 105 MMOL/L (ref 95–110)
CO2 SERPL-SCNC: 28 MMOL/L (ref 23–29)
CREAT SERPL-MCNC: 1.1 MG/DL (ref 0.5–1.4)
DIFFERENTIAL METHOD BLD: NORMAL
EOSINOPHIL # BLD AUTO: 0.2 K/UL (ref 0–0.5)
EOSINOPHIL NFR BLD: 3 % (ref 0–8)
ERYTHROCYTE [DISTWIDTH] IN BLOOD BY AUTOMATED COUNT: 13.9 % (ref 11.5–14.5)
EST. GFR  (NO RACE VARIABLE): >60 ML/MIN/1.73 M^2
GLUCOSE SERPL-MCNC: 115 MG/DL (ref 70–110)
HCT VFR BLD AUTO: 50.6 % (ref 40–54)
HGB BLD-MCNC: 17.2 G/DL (ref 14–18)
IMM GRANULOCYTES # BLD AUTO: 0.03 K/UL (ref 0–0.04)
IMM GRANULOCYTES NFR BLD AUTO: 0.4 % (ref 0–0.5)
LYMPHOCYTES # BLD AUTO: 1.9 K/UL (ref 1–4.8)
LYMPHOCYTES NFR BLD: 24.4 % (ref 18–48)
MCH RBC QN AUTO: 29.1 PG (ref 27–31)
MCHC RBC AUTO-ENTMCNC: 34 G/DL (ref 32–36)
MCV RBC AUTO: 86 FL (ref 82–98)
MONOCYTES # BLD AUTO: 0.6 K/UL (ref 0.3–1)
MONOCYTES NFR BLD: 7.3 % (ref 4–15)
NEUTROPHILS # BLD AUTO: 5.1 K/UL (ref 1.8–7.7)
NEUTROPHILS NFR BLD: 64.1 % (ref 38–73)
NRBC BLD-RTO: 0 /100 WBC
PLATELET # BLD AUTO: 221 K/UL (ref 150–450)
PMV BLD AUTO: 10 FL (ref 9.2–12.9)
POCT GLUCOSE: 80 MG/DL (ref 70–110)
POTASSIUM SERPL-SCNC: 5 MMOL/L (ref 3.5–5.1)
PROT SERPL-MCNC: 6.8 G/DL (ref 6–8.4)
RBC # BLD AUTO: 5.92 M/UL (ref 4.6–6.2)
SODIUM SERPL-SCNC: 140 MMOL/L (ref 136–145)
TROPONIN I SERPL DL<=0.01 NG/ML-MCNC: 0.01 NG/ML (ref 0–0.03)
WBC # BLD AUTO: 7.96 K/UL (ref 3.9–12.7)

## 2024-08-02 PROCEDURE — 25000003 PHARM REV CODE 250

## 2024-08-02 PROCEDURE — 36415 COLL VENOUS BLD VENIPUNCTURE: CPT | Performed by: INTERNAL MEDICINE

## 2024-08-02 PROCEDURE — 99238 HOSP IP/OBS DSCHRG MGMT 30/<: CPT | Mod: ,,, | Performed by: INTERNAL MEDICINE

## 2024-08-02 PROCEDURE — 80053 COMPREHEN METABOLIC PANEL: CPT | Performed by: INTERNAL MEDICINE

## 2024-08-02 PROCEDURE — 25000003 PHARM REV CODE 250: Performed by: INTERNAL MEDICINE

## 2024-08-02 PROCEDURE — S4991 NICOTINE PATCH NONLEGEND: HCPCS

## 2024-08-02 PROCEDURE — 84484 ASSAY OF TROPONIN QUANT: CPT | Performed by: INTERNAL MEDICINE

## 2024-08-02 PROCEDURE — 85025 COMPLETE CBC W/AUTO DIFF WBC: CPT | Performed by: INTERNAL MEDICINE

## 2024-08-02 RX ORDER — ASPIRIN 81 MG/1
81 TABLET ORAL DAILY
Qty: 30 TABLET | Refills: 11 | Status: SHIPPED | OUTPATIENT
Start: 2024-08-03 | End: 2024-08-16 | Stop reason: SDUPTHER

## 2024-08-02 RX ORDER — METOPROLOL TARTRATE 25 MG/1
25 TABLET, FILM COATED ORAL 2 TIMES DAILY
Qty: 180 TABLET | Refills: 3 | Status: SHIPPED | OUTPATIENT
Start: 2024-08-02 | End: 2025-08-02

## 2024-08-02 RX ORDER — IBUPROFEN 200 MG
1 TABLET ORAL DAILY
Qty: 30 PATCH | Refills: 0 | Status: SHIPPED | OUTPATIENT
Start: 2024-08-03

## 2024-08-02 RX ORDER — ATORVASTATIN CALCIUM 80 MG/1
80 TABLET, FILM COATED ORAL NIGHTLY
Qty: 90 TABLET | Refills: 3 | Status: SHIPPED | OUTPATIENT
Start: 2024-08-02 | End: 2024-08-08 | Stop reason: SDUPTHER

## 2024-08-02 RX ORDER — PRASUGREL 10 MG/1
10 TABLET, FILM COATED ORAL DAILY
Qty: 30 TABLET | Refills: 11 | Status: SHIPPED | OUTPATIENT
Start: 2024-08-03 | End: 2024-08-16 | Stop reason: SDUPTHER

## 2024-08-02 RX ADMIN — ALPRAZOLAM 0.5 MG: 0.5 TABLET ORAL at 10:08

## 2024-08-02 RX ADMIN — ASPIRIN 81 MG: 81 TABLET, COATED ORAL at 08:08

## 2024-08-02 RX ADMIN — METOPROLOL TARTRATE 25 MG: 25 TABLET, FILM COATED ORAL at 08:08

## 2024-08-02 RX ADMIN — MUPIROCIN: 20 OINTMENT TOPICAL at 08:08

## 2024-08-02 RX ADMIN — PRASUGREL 10 MG: 10 TABLET, FILM COATED ORAL at 10:08

## 2024-08-02 RX ADMIN — NICOTINE 1 PATCH: 14 PATCH, EXTENDED RELEASE TRANSDERMAL at 10:08

## 2024-08-02 RX ADMIN — SACUBITRIL AND VALSARTAN 1 TABLET: 24; 26 TABLET, FILM COATED ORAL at 08:08

## 2024-08-02 RX ADMIN — ALPRAZOLAM 0.5 MG: 0.5 TABLET ORAL at 03:08

## 2024-08-02 NOTE — ASSESSMENT & PLAN NOTE
-Presents with intermittent left-sided CP/pressure with recent worsening over past week, currently PC free during exam  -EKG yesterday with acute anterolateral MI/STEMI, repeat today with concerning ST-T changes/Q waves, delayed presentation  -ASA, heparin gtt, statin  -TTE ordered  -Urgent LHC today by Dr. Gonzalez. All risks, benefits, and treatment alternatives explained to patient in detail. All questions answered. He has agreed to proceed. Further re'cs to follow.    8/1/24  -s/p LHC with PCI of LAD  -Stable this AM, CP free  -Continue ASA, Effient, BB, statin  -Entresto initiated  -Needs LifeVest for SCD prevention  -Transfer to telemetry    8/2/24  -Stable  -Continue ASA, BB, Effient, statin, Entresto  -LifeVest arranged

## 2024-08-02 NOTE — PROGRESS NOTES
[facility] Telemetry ([facility])  Cardiology  Progress Note    Patient Name: Toño Nelson  MRN: [id number]  Admission Date: 7/31/2024  Hospital Length of Stay: 2 days  Code Status: No Order   Attending Physician: Josh Gonzalez MD   Primary Care Physician: Elvis Hein MD  Expected Discharge Date: 8/2/2024  Principal Problem:MI (myocardial infarction)    Subjective:   HPI:  Mr. Nelson is a 49 year old male patient whose current medical conditions include long time tobacco abuse,  HTN, hyperlipidemia, prior CVA (by CT scan) who presented to [facility] ED today due to abnormal EKG. Patient recently had his work physical which included an EKG which resulted back with acute MI/STEMI yesterday. He was called and advised to come to the ED but did not show up until today as he was at work. He endorses intermittent bouts of substernal, left-sided chest pressure/tightness, ongoing for the past month o. Recently worsening over the past week. Reports pain radiates down his left arm at times. Previously thought was pain was due to prior traumatic fall he had many years ago. He denies any associated SOB, nausea, vomiting, palpitations, near syncope, or syncope. No prior CV history. Remote history of drug abuse (cocaine, marijuana) but denies any recent usage. States he established care with a new PCP in January and has been compliant with those medications. EKG reviewed, SR with anterolateral infarct---subacute with delayed presentation. [facility] planned today by Dr. Gonzalez, further rec's to follow.       Hospital Course:   8/1/24-Patient seen and examined today, s/p C yesterday with PCI of LAD. EF noted to be 25%. Feels well. No CP or SOB. No CV complaints. Anxious to be discharged home. Labs reviewed. LifeVest ordered for SCD prevention. Ok to transfer to telemetry.    8/2/24-Patient seen and examined today, ambulating in room. Feels well. No CP, wants to go home. Labs reviewed. Awaiting LifeVest.     Interval History:      Review of Systems   Constitutional: Negative.   HENT: Negative.     Eyes: Negative.    Cardiovascular: Negative.    Respiratory: Negative.     Endocrine: Negative.    Hematologic/Lymphatic: Negative.    Skin: Negative.    Musculoskeletal: Negative.    Gastrointestinal: Negative.    Genitourinary: Negative.    Neurological: Negative.    Psychiatric/Behavioral: Negative.     Allergic/Immunologic: Negative.      Objective:     Vital Signs (Most Recent):  Temp: 98 °F (36.7 °C) (08/02/24 0735)  Pulse: (!) 54 (08/02/24 1247)  Resp: 19 (08/02/24 1247)  BP: (!) 139/91 (08/02/24 1247)  SpO2: 100 % (08/02/24 1247) Vital Signs (24h Range):  Temp:  [97.4 °F (36.3 °C)-98.4 °F (36.9 °C)] 98 °F (36.7 °C)  Pulse:  [54-73] 54  Resp:  [19-41] 19  SpO2:  [99 %-100 %] 100 %  BP: (133-145)/(87-95) 139/91     Weight: 85.3 kg (188 lb)  Body mass index is 29.44 kg/m².     SpO2: 100 %       No intake or output data in the 24 hours ending 08/02/24 1451    Lines/Drains/Airways       Peripheral Intravenous Line  Duration                  Peripheral IV - Single Lumen 07/31/24 1121 18 G Left;Posterior Hand 2 days         Peripheral IV - Single Lumen 07/31/24 1127 18 G Right Antecubital 2 days                       Physical Exam  Vitals and nursing note reviewed.   Constitutional:       General: He is not in acute distress.     Appearance: Normal appearance. He is well-developed. He is not diaphoretic.   HENT:      Head: Normocephalic and atraumatic.   Eyes:      General:         Right eye: No discharge.         Left eye: No discharge.      Pupils: Pupils are equal, round, and reactive to light.   Cardiovascular:      Rate and Rhythm: Regular rhythm. Bradycardia present.      Heart sounds: Normal heart sounds, S1 normal and S2 normal. No murmur heard.  Pulmonary:      Effort: Pulmonary effort is normal. No respiratory distress.      Breath sounds: Normal breath sounds.   Abdominal:      General: There is no distension.   Musculoskeletal:       Right lower leg: No edema.      Left lower leg: No edema.   Skin:     General: Skin is warm and dry.      Findings: No erythema.      Comments: L radial access site C/D/I; no bleeding erythema or drainage, intact pulse   Neurological:      Mental Status: He is alert and oriented to person, place, and time.   Psychiatric:         Mood and Affect: Mood normal.         Behavior: Behavior normal.            Significant Labs: CMP   Recent Labs   Lab 08/01/24  0331 08/02/24  0658    140   K 3.6 5.0    105   CO2 23 28    115*   BUN 22* 17   CREATININE 1.0 1.1   CALCIUM 9.1 9.3   PROT 5.9* 6.8   ALBUMIN 3.6 3.9   BILITOT 0.6 0.7   ALKPHOS 110 129   AST 11 12   ALT 18 22   ANIONGAP 10 7*   , CBC   Recent Labs   Lab 08/01/24  0331 08/02/24  0658   WBC 7.56 7.96   HGB 16.3 17.2   HCT 46.5 50.6    221   , Troponin   Recent Labs   Lab 07/31/24  1917 08/01/24  0117 08/02/24  0837   TROPONINI 0.023 0.045* 0.015   , and All pertinent lab results from the last 24 hours have been reviewed.    Significant Imaging: Echocardiogram: Transthoracic echo (TTE) complete (Cupid Only):   Results for orders placed or performed during the hospital encounter of 07/31/24   Echo   Result Value Ref Range    BSA 2.01 m2    LA WIDTH 3.8 cm    LVOT stroke volume 92.17 cm3    LVIDd 5.36 3.5 - 6.0 cm    LV Systolic Volume 62.50 mL    LV Systolic Volume Index 31.7 mL/m2    LVIDs 3.81 2.1 - 4.0 cm    LV Diastolic Volume 138.85 mL    LV Diastolic Volume Index 70.48 mL/m2    Left Ventricular End Systolic Volume by Teichholz Method 62.50 mL    Left Ventricular End Diastolic Volume by Teichholz Method 138.85 mL    IVS 1.14 (A) 0.6 - 1.1 cm    LVOT diameter 2.24 cm    LVOT area 3.9 cm2    FS 29 28 - 44 %    Left Ventricle Relative Wall Thickness 0.46 cm    Posterior Wall 1.22 (A) 0.6 - 1.1 cm    LV mass 255.28 g    LV Mass Index 130 g/m2    MV Peak E Martin 0.56 m/s    TDI LATERAL 0.07 m/s    TDI SEPTAL 0.07 m/s    E/E' ratio 8.00 m/s     MV Peak A Martin 0.93 m/s    TR Max Martin 1.92 m/s    E/A ratio 0.60     IVRT 117.98 msec    E wave deceleration time 168.15 msec    LV SEPTAL E/E' RATIO 8.00 m/s    LA Volume Index 28.4 mL/m2    LV LATERAL E/E' RATIO 8.00 m/s    LA volume 55.92 cm3    LVOT peak martin 1.21 m/s    Left Ventricular Outflow Tract Mean Velocity 0.75 cm/s    Left Ventricular Outflow Tract Mean Gradient 2.63 mmHg    RVOT peak VTI 12.7 cm    TAPSE 1.95 cm    LA size 3.29 cm    Left Atrium Minor Axis 4.88 cm    Left Atrium Major Axis 5.71 cm    RA Major Axis 4.37 cm    AV mean gradient 5 mmHg    AV peak gradient 9 mmHg    Ao peak martin 1.46 m/s    Ao VTI 29.80 cm    LVOT peak VTI 23.40 cm    AV valve area 3.09 cm²    AV Velocity Ratio 0.83     AV index (prosthetic) 0.79     SHAYY by Velocity Ratio 3.26 cm²    Triscuspid Valve Regurgitation Peak Gradient 15 mmHg    PV mean gradient 1 mmHg    RVOT peak martin 0.48 m/s    Ao root annulus 3.11 cm    STJ 3.60 cm    Ascending aorta 3.73 cm    IVC diameter 0.94 cm    Mean e' 0.07 m/s    ZLVIDS 0.72     ZLVIDD -0.55     TV resting pulmonary artery pressure 18 mmHg    RV TB RVSP 5 mmHg    Est. RA pres 3 mmHg    EF 35 %    Narrative      Left Ventricle: The left ventricle is normal in size. Normal wall   thickness. There is concentric hypertrophy. Regional wall motion   abnormalities present. There is reduced systolic function. Ejection   fraction by visual approximation is 35%. Grade I diastolic dysfunction.    Right Ventricle: Normal right ventricular cavity size. Wall thickness   is normal. Systolic function is normal.    Pulmonary Artery: The estimated pulmonary artery systolic pressure is   18 mmHg.    IVC/SVC: Normal venous pressure at 3 mmHg.      and EKG: Reviewed  Assessment and Plan:   Patient remains stable post intervention of LAD. No CV complaints. Meds adjusted. Follow-up in clinic.    * MI (myocardial infarction)  -Presents with intermittent left-sided CP/pressure with recent worsening over past  week, currently PC free during exam  -EKG yesterday with acute anterolateral MI/STEMI, repeat today with concerning ST-T changes/Q waves, delayed presentation  -ASA, heparin gtt, statin  -TTE ordered  -Urgent LHC today by Dr. Gonzalez. All risks, benefits, and treatment alternatives explained to patient in detail. All questions answered. He has agreed to proceed. Further re'cs to follow.    8/1/24  -s/p LHC with PCI of LAD  -Stable this AM, CP free  -Continue ASA, Effient, BB, statin  -Entresto initiated  -Needs LifeVest for SCD prevention  -Transfer to telemetry    8/2/24  -Stable  -Continue ASA, BB, Effient, statin, Entresto  -LifeVest arranged    Ischemic cardiomyopathy  -EF 25% with WMA, s/p LAD intervention  -Continue BB, Entresto  -LifeVest for SCD prevention    Abnormal EKG  -See plan under MI    Tobacco abuse  -Cessation advised    Hyperlipidemia  -Statin    Hypertension  -Will adjust meds post-procedure    8/1/24  -Continue BB, Entresto        VTE Risk Mitigation (From admission, onward)      None            Keli Sams PA-C  Cardiology  [facility] - Telemetry ([facility])

## 2024-08-02 NOTE — SUBJECTIVE & OBJECTIVE
Interval History:     Review of Systems   Constitutional: Negative.   HENT: Negative.     Eyes: Negative.    Cardiovascular: Negative.    Respiratory: Negative.     Endocrine: Negative.    Hematologic/Lymphatic: Negative.    Skin: Negative.    Musculoskeletal: Negative.    Gastrointestinal: Negative.    Genitourinary: Negative.    Neurological: Negative.    Psychiatric/Behavioral: Negative.     Allergic/Immunologic: Negative.      Objective:     Vital Signs (Most Recent):  Temp: 98 °F (36.7 °C) (08/02/24 0735)  Pulse: (!) 54 (08/02/24 1247)  Resp: 19 (08/02/24 1247)  BP: (!) 139/91 (08/02/24 1247)  SpO2: 100 % (08/02/24 1247) Vital Signs (24h Range):  Temp:  [97.4 °F (36.3 °C)-98.4 °F (36.9 °C)] 98 °F (36.7 °C)  Pulse:  [54-73] 54  Resp:  [19-41] 19  SpO2:  [99 %-100 %] 100 %  BP: (133-145)/(87-95) 139/91     Weight: 85.3 kg (188 lb)  Body mass index is 29.44 kg/m².     SpO2: 100 %       No intake or output data in the 24 hours ending 08/02/24 1451    Lines/Drains/Airways       Peripheral Intravenous Line  Duration                  Peripheral IV - Single Lumen 07/31/24 1121 18 G Left;Posterior Hand 2 days         Peripheral IV - Single Lumen 07/31/24 1127 18 G Right Antecubital 2 days                       Physical Exam  Vitals and nursing note reviewed.   Constitutional:       General: He is not in acute distress.     Appearance: Normal appearance. He is well-developed. He is not diaphoretic.   HENT:      Head: Normocephalic and atraumatic.   Eyes:      General:         Right eye: No discharge.         Left eye: No discharge.      Pupils: Pupils are equal, round, and reactive to light.   Cardiovascular:      Rate and Rhythm: Regular rhythm. Bradycardia present.      Heart sounds: Normal heart sounds, S1 normal and S2 normal. No murmur heard.  Pulmonary:      Effort: Pulmonary effort is normal. No respiratory distress.      Breath sounds: Normal breath sounds.   Abdominal:      General: There is no distension.    Musculoskeletal:      Right lower leg: No edema.      Left lower leg: No edema.   Skin:     General: Skin is warm and dry.      Findings: No erythema.      Comments: L radial access site C/D/I; no bleeding erythema or drainage, intact pulse   Neurological:      Mental Status: He is alert and oriented to person, place, and time.   Psychiatric:         Mood and Affect: Mood normal.         Behavior: Behavior normal.            Significant Labs: CMP   Recent Labs   Lab 08/01/24  0331 08/02/24  0658    140   K 3.6 5.0    105   CO2 23 28    115*   BUN 22* 17   CREATININE 1.0 1.1   CALCIUM 9.1 9.3   PROT 5.9* 6.8   ALBUMIN 3.6 3.9   BILITOT 0.6 0.7   ALKPHOS 110 129   AST 11 12   ALT 18 22   ANIONGAP 10 7*   , CBC   Recent Labs   Lab 08/01/24  0331 08/02/24  0658   WBC 7.56 7.96   HGB 16.3 17.2   HCT 46.5 50.6    221   , Troponin   Recent Labs   Lab 07/31/24  1917 08/01/24  0117 08/02/24  0837   TROPONINI 0.023 0.045* 0.015   , and All pertinent lab results from the last 24 hours have been reviewed.    Significant Imaging: Echocardiogram: Transthoracic echo (TTE) complete (Cupid Only):   Results for orders placed or performed during the hospital encounter of 07/31/24   Echo   Result Value Ref Range    BSA 2.01 m2    LA WIDTH 3.8 cm    LVOT stroke volume 92.17 cm3    LVIDd 5.36 3.5 - 6.0 cm    LV Systolic Volume 62.50 mL    LV Systolic Volume Index 31.7 mL/m2    LVIDs 3.81 2.1 - 4.0 cm    LV Diastolic Volume 138.85 mL    LV Diastolic Volume Index 70.48 mL/m2    Left Ventricular End Systolic Volume by Teichholz Method 62.50 mL    Left Ventricular End Diastolic Volume by Teichholz Method 138.85 mL    IVS 1.14 (A) 0.6 - 1.1 cm    LVOT diameter 2.24 cm    LVOT area 3.9 cm2    FS 29 28 - 44 %    Left Ventricle Relative Wall Thickness 0.46 cm    Posterior Wall 1.22 (A) 0.6 - 1.1 cm    LV mass 255.28 g    LV Mass Index 130 g/m2    MV Peak E Martin 0.56 m/s    TDI LATERAL 0.07 m/s    TDI SEPTAL 0.07 m/s     E/E' ratio 8.00 m/s    MV Peak A Martin 0.93 m/s    TR Max Martin 1.92 m/s    E/A ratio 0.60     IVRT 117.98 msec    E wave deceleration time 168.15 msec    LV SEPTAL E/E' RATIO 8.00 m/s    LA Volume Index 28.4 mL/m2    LV LATERAL E/E' RATIO 8.00 m/s    LA volume 55.92 cm3    LVOT peak martin 1.21 m/s    Left Ventricular Outflow Tract Mean Velocity 0.75 cm/s    Left Ventricular Outflow Tract Mean Gradient 2.63 mmHg    RVOT peak VTI 12.7 cm    TAPSE 1.95 cm    LA size 3.29 cm    Left Atrium Minor Axis 4.88 cm    Left Atrium Major Axis 5.71 cm    RA Major Axis 4.37 cm    AV mean gradient 5 mmHg    AV peak gradient 9 mmHg    Ao peak martin 1.46 m/s    Ao VTI 29.80 cm    LVOT peak VTI 23.40 cm    AV valve area 3.09 cm²    AV Velocity Ratio 0.83     AV index (prosthetic) 0.79     SHAYY by Velocity Ratio 3.26 cm²    Triscuspid Valve Regurgitation Peak Gradient 15 mmHg    PV mean gradient 1 mmHg    RVOT peak martin 0.48 m/s    Ao root annulus 3.11 cm    STJ 3.60 cm    Ascending aorta 3.73 cm    IVC diameter 0.94 cm    Mean e' 0.07 m/s    ZLVIDS 0.72     ZLVIDD -0.55     TV resting pulmonary artery pressure 18 mmHg    RV TB RVSP 5 mmHg    Est. RA pres 3 mmHg    EF 35 %    Narrative      Left Ventricle: The left ventricle is normal in size. Normal wall   thickness. There is concentric hypertrophy. Regional wall motion   abnormalities present. There is reduced systolic function. Ejection   fraction by visual approximation is 35%. Grade I diastolic dysfunction.    Right Ventricle: Normal right ventricular cavity size. Wall thickness   is normal. Systolic function is normal.    Pulmonary Artery: The estimated pulmonary artery systolic pressure is   18 mmHg.    IVC/SVC: Normal venous pressure at 3 mmHg.      and EKG: Reviewed

## 2024-08-02 NOTE — PLAN OF CARE
08/02/24 1123   Post-Acute Status   Post-Acute Authorization HME   HME Status (!) Pending Delivery   Discharge Delays None known at this time   Discharge Plan   Discharge Plan A Home with family;Home   Discharge Plan B Home with family     LifeVest approved; pending delivery to bedside - ETA 3PM

## 2024-08-02 NOTE — NURSING TRANSFER
Nursing Transfer Note      8/2/2024   3:43 AM    Nurse giving handoff: CHARY Claire  Nurse receiving handoff: CHARY García    Reason patient is being transferred: downgrade orders    Transfer From: ICU 9  Transfer To: Tele 243    Transfer via wheelchair    Transfer with cardiac monitoring    Transported by CHARY Claire    Order for Tele Monitor? Yes          Medicines sent: mupirocin        Patient belongings transferred with patient: Yes    Chart send with patient: Yes    Upon arrival to floor: patient oriented to room, call bell in reach, and bed in lowest position

## 2024-08-02 NOTE — DISCHARGE SUMMARY
[facility] - Telemetry (Hospital)  Cardiology  Discharge Summary      Patient Name: Toño Nelson  MRN: [id number]  Admission Date: 7/31/2024  Hospital Length of Stay: 2 days  Discharge Date and Time:  08/02/2024 2:56 PM  Attending Physician: Josh Gonzalez MD    Discharging Provider: Keli Sams PA-C  Primary Care Physician: Elvis Hein MD    HPI:   Mr. Nelson is a 49 year old male patient whose current medical conditions include long time tobacco abuse,  HTN, hyperlipidemia, prior CVA (by CT scan) who presented to [facility] ED today due to abnormal EKG. Patient recently had his work physical which included an EKG which resulted back with acute MI/STEMI yesterday. He was called and advised to come to the ED but did not show up until today as he was at work. He endorses intermittent bouts of substernal, left-sided chest pressure/tightness, ongoing for the past month o. Recently worsening over the past week. Reports pain radiates down his left arm at times. Previously thought was pain was due to prior traumatic fall he had many years ago. He denies any associated SOB, nausea, vomiting, palpitations, near syncope, or syncope. No prior CV history. Remote history of drug abuse (cocaine, marijuana) but denies any recent usage. States he established care with a new PCP in January and has been compliant with those medications. EKG reviewed, SR with anterolateral infarct---subacute with delayed presentation. [facility] planned today by Dr. Gonzalez, further rec's to follow.        Procedure(s) (LRB):  Left heart cath (Left)  Angioplasty-coronary  Stent, Drug Eluting, Single Vessel, Coronary     Indwelling Lines/Drains at time of discharge:  Lines/Drains/Airways       None                   Hospital Course:  8/1/24-Patient seen and examined today, s/p LHC yesterday with PCI of LAD. EF noted to be 25%. Feels well. No CP or SOB. No CV complaints. Anxious to be discharged home. Labs reviewed. LifeVest ordered for SCD  prevention. Ok to transfer to telemetry.    8/2/24-Patient seen and examined today, ambulating in room. Feels well. No CP, wants to go home. Labs reviewed. Awaiting LifeVest.     Goals of Care Treatment Preferences:         Consults:   Consults (From admission, onward)          Status Ordering Provider     Inpatient consult to Social Work/Case Management  Once        Provider:  (Not yet assigned)    Completed KELI SAMS     Inpatient consult to Registered Dietitian/Nutritionist  Once        Provider:  (Not yet assigned)    Completed CONOR STOCKTON            Significant Diagnostic Studies: LHC, labs    Pending Diagnostic Studies:       None            Final Active Diagnoses:    Diagnosis Date Noted POA    PRINCIPAL PROBLEM:  MI (myocardial infarction) [I21.9] 07/31/2024 Yes    Ischemic cardiomyopathy [I25.5] 08/01/2024 Yes    Hypertension [I10] 07/31/2024 Yes    Hyperlipidemia [E78.5] 07/31/2024 Yes    Tobacco abuse [Z72.0] 07/31/2024 Yes    Abnormal EKG [R94.31] 07/31/2024 Yes      Problems Resolved During this Admission:     No new Assessment & Plan notes have been filed under this hospital service since the last note was generated.  Service: Cardiology      Discharged Condition: good    Disposition: Home or Self Care    Follow Up:   Follow-up Information       Keli Sams, PAWALDEMAR Follow up in 1 week(s).    Specialty: Cardiology  Contact information:  [address] [place] [address] DR Shai FAY [address] [phone]                           Patient Instructions:      Ambulatory referral/consult to Smoking Cessation Program   Standing Status: Future   Referral Priority: Routine Referral Type: Consultation   Referral Reason: Specialty Services Required   Requested Specialty: CTTS   Number of Visits Requested: 1     Diet Cardiac     Other restrictions (specify):   Order Comments: No heavy bending or lifting  No strenuous activity  No working outside in heat  Ok to shower, avoid soaking radial access site  in water     Notify your health care provider if you experience any of the following:  temperature >100.4     Notify your health care provider if you experience any of the following:  persistent nausea and vomiting or diarrhea     Notify your health care provider if you experience any of the following:  severe uncontrolled pain     Notify your health care provider if you experience any of the following:  redness, tenderness, or signs of infection (pain, swelling, redness, odor or green/yellow discharge around incision site)     Notify your health care provider if you experience any of the following:  difficulty breathing or increased cough     Notify your health care provider if you experience any of the following:  severe persistent headache     Notify your health care provider if you experience any of the following:  worsening rash     Notify your health care provider if you experience any of the following:  persistent dizziness, light-headedness, or visual disturbances     Notify your health care provider if you experience any of the following:  increased confusion or weakness     Notify your health care provider if you experience any of the following:     No dressing needed     Cardiac rehab phase II   Standing Status: Future Standing Exp. Date: 07/31/25     Order Specific Question Answer Comments   Department [facility] CARDIAC REHAB    Select qualifying diagnosis: I21.02 - ST elevation (STEMI) myocardial infarction involving left anterior descending coronary artery      Reason for not Prescribing Nicotine Replacement   Order Comments: Ordered nicotine patch     Order Specific Question Answer Comments   Reason for not Prescribing: Not medically appropriate at this time See below     Activity as tolerated     Medications:  Reconciled Home Medications:      Medication List        START taking these medications      metoprolol tartrate 25 MG tablet  Commonly known as: LOPRESSOR  Take 1 tablet (25 mg total) by mouth 2  (two) times daily.     nicotine 14 mg/24 hr  Commonly known as: NICODERM CQ  Place 1 patch onto the skin once daily.  Start taking on: August 3, 2024     prasugreL 10 mg Tab  Commonly known as: EFFIENT  Take 1 tablet (10 mg total) by mouth once daily.  Start taking on: August 3, 2024     sacubitriL-valsartan 24-26 mg per tablet  Commonly known as: ENTRESTO  Take 1 tablet by mouth 2 (two) times daily.            CHANGE how you take these medications      * aspirin 81 MG Chew  Take 81 mg by mouth.  What changed: Another medication with the same name was added. Make sure you understand how and when to take each.     * aspirin 81 MG EC tablet  Commonly known as: ECOTRIN  Take 1 tablet (81 mg total) by mouth once daily.  Start taking on: August 3, 2024  What changed: You were already taking a medication with the same name, and this prescription was added. Make sure you understand how and when to take each.     atorvastatin 80 MG tablet  Commonly known as: LIPITOR  Take 1 tablet (80 mg total) by mouth every evening.  What changed:   medication strength  how much to take           * This list has 2 medication(s) that are the same as other medications prescribed for you. Read the directions carefully, and ask your doctor or other care provider to review them with you.                CONTINUE taking these medications      gabapentin 600 MG tablet  Commonly known as: NEURONTIN  Take 600 mg by mouth.     levETIRAcetam 500 MG Tab  Commonly known as: KEPPRA  Take 500 mg by mouth.     MIEBO 100 % Drop  Generic drug: perfluorohexyloctane (PF)  Place 1 drop into both eyes 4 (four) times daily.            STOP taking these medications      amLODIPine 10 MG tablet  Commonly known as: NORVASC     lisinopriL-hydrochlorothiazide 20-25 mg Tab  Commonly known as: PRINZIDE,ZESTORETIC     losartan 25 MG tablet  Commonly known as: COZAAR     valsartan-hydrochlorothiazide 80-12.5 mg per tablet  Commonly known as: DIOVAN-HCT     zolpidem 10 mg  Tab  Commonly known as: AMBIEN            ASK your doctor about these medications      naloxone 4 mg/actuation Spry  Commonly known as: NARCAN  4mg by nasal route as needed for opioid overdose; may repeat every 2-3 minutes in alternating nostrils until medical help arrives. Call 911              Time spent on the discharge of patient: 25 minutes    Keli Sams PA-C  Cardiology  [facility] - Telemetry ([facility])

## 2024-08-02 NOTE — PLAN OF CARE
[facility] - Telemetry (Hospital)  Discharge Final Note    Primary Care Provider: Elvis Hein MD    Expected Discharge Date: 8/2/2024    Final Discharge Note (most recent)       Final Note - 08/02/24 1524          Final Note    Assessment Type Final Discharge Note     Anticipated Discharge Disposition Home or Self Care     Hospital Resources/Appts/Education Provided Post-Acute resouces added to AVS;Appointments scheduled and added to AVS        Post-Acute Status    Post-Acute Authorization HME     E Status Set-up Complete/Auth obtained     Discharge Delays None known at this time                   Pt to discharge home today. PCP scheduled on AVS: Hospital Follow Up with Elvis Hein  Thursday Aug 8, 2024 12:00 PM  Zoll Life Vest delivered to bedside     No additional CM needs for discharge.     Important Message from Medicare             Contact Info       Keli Sams [facility]   Specialty: Cardiology    [address] [place] [address] [address] [address]   Phone: [phone]       Next Steps: Follow up in 1 week(s)

## 2024-08-05 ENCOUNTER — PATIENT OUTREACH (OUTPATIENT)
Dept: ADMINISTRATIVE | Facility: CLINIC | Age: 49
End: 2024-08-05
Payer: COMMERCIAL

## 2024-08-07 ENCOUNTER — HOSPITAL ENCOUNTER (EMERGENCY)
Facility: HOSPITAL | Age: 49
Discharge: HOME | End: 2024-08-07
Attending: EMERGENCY MEDICINE | Admitting: EMERGENCY MEDICINE
Payer: COMMERCIAL

## 2024-08-07 VITALS
TEMPERATURE: 99 F | DIASTOLIC BLOOD PRESSURE: 90 MMHG | RESPIRATION RATE: 18 BRPM | SYSTOLIC BLOOD PRESSURE: 145 MMHG | BODY MASS INDEX: 29.44 KG/M2 | OXYGEN SATURATION: 98 % | HEART RATE: 72 BPM | WEIGHT: 188 LBS

## 2024-08-07 DIAGNOSIS — R79.89 ELEVATED TROPONIN: ICD-10-CM

## 2024-08-07 DIAGNOSIS — I21.4 NSTEMI (NON-ST ELEVATED MYOCARDIAL INFARCTION): Primary | ICD-10-CM

## 2024-08-07 DIAGNOSIS — I25.10 CAD (CORONARY ARTERY DISEASE): ICD-10-CM

## 2024-08-07 DIAGNOSIS — R55 SYNCOPE: ICD-10-CM

## 2024-08-07 DIAGNOSIS — N17.9 AKI (ACUTE KIDNEY INJURY): ICD-10-CM

## 2024-08-07 DIAGNOSIS — R94.31 ABNORMAL EKG: ICD-10-CM

## 2024-08-07 LAB
ALBUMIN SERPL BCP-MCNC: 4.6 G/DL (ref 3.5–5.2)
ALP SERPL-CCNC: 130 U/L (ref 55–135)
ALT SERPL W/O P-5'-P-CCNC: 52 U/L (ref 10–44)
ANION GAP SERPL CALC-SCNC: 15 MMOL/L (ref 8–16)
APTT PPP: 28.6 SEC (ref 21–32)
AST SERPL-CCNC: 42 U/L (ref 10–40)
BASOPHILS # BLD AUTO: 0.08 K/UL (ref 0–0.2)
BASOPHILS NFR BLD: 0.5 % (ref 0–1.9)
BILIRUB SERPL-MCNC: 1.1 MG/DL (ref 0.1–1)
BNP SERPL-MCNC: 95 PG/ML (ref 0–99)
BUN SERPL-MCNC: 30 MG/DL (ref 6–20)
CALCIUM SERPL-MCNC: 10 MG/DL (ref 8.7–10.5)
CHLORIDE SERPL-SCNC: 105 MMOL/L (ref 95–110)
CO2 SERPL-SCNC: 20 MMOL/L (ref 23–29)
CREAT SERPL-MCNC: 2.3 MG/DL (ref 0.5–1.4)
DIFFERENTIAL METHOD BLD: ABNORMAL
EOSINOPHIL # BLD AUTO: 0.1 K/UL (ref 0–0.5)
EOSINOPHIL NFR BLD: 0.8 % (ref 0–8)
ERYTHROCYTE [DISTWIDTH] IN BLOOD BY AUTOMATED COUNT: 14.1 % (ref 11.5–14.5)
EST. GFR  (NO RACE VARIABLE): 34.4 ML/MIN/1.73 M^2
GLUCOSE SERPL-MCNC: 153 MG/DL (ref 70–110)
HCT VFR BLD AUTO: 54.2 % (ref 40–54)
HGB BLD-MCNC: 19 G/DL (ref 14–18)
IMM GRANULOCYTES # BLD AUTO: 0.1 K/UL (ref 0–0.04)
IMM GRANULOCYTES NFR BLD AUTO: 0.6 % (ref 0–0.5)
INR PPP: 1 (ref 0.8–1.2)
LACTATE SERPL-SCNC: 1.6 MMOL/L (ref 0.5–2.2)
LYMPHOCYTES # BLD AUTO: 1.1 K/UL (ref 1–4.8)
LYMPHOCYTES NFR BLD: 6.3 % (ref 18–48)
MCH RBC QN AUTO: 29.3 PG (ref 27–31)
MCHC RBC AUTO-ENTMCNC: 35.1 G/DL (ref 32–36)
MCV RBC AUTO: 84 FL (ref 82–98)
MONOCYTES # BLD AUTO: 1.3 K/UL (ref 0.3–1)
MONOCYTES NFR BLD: 7.4 % (ref 4–15)
NEUTROPHILS # BLD AUTO: 14.9 K/UL (ref 1.8–7.7)
NEUTROPHILS NFR BLD: 84.4 % (ref 38–73)
NRBC BLD-RTO: 0 /100 WBC
PLATELET # BLD AUTO: 279 K/UL (ref 150–450)
PMV BLD AUTO: 10.1 FL (ref 9.2–12.9)
POCT GLUCOSE: 137 MG/DL (ref 70–110)
POTASSIUM SERPL-SCNC: 4.5 MMOL/L (ref 3.5–5.1)
PROT SERPL-MCNC: 8.4 G/DL (ref 6–8.4)
PROTHROMBIN TIME: 11.3 SEC (ref 9–12.5)
RBC # BLD AUTO: 6.49 M/UL (ref 4.6–6.2)
SODIUM SERPL-SCNC: 140 MMOL/L (ref 136–145)
TROPONIN I SERPL DL<=0.01 NG/ML-MCNC: 0.36 NG/ML (ref 0–0.03)
WBC # BLD AUTO: 17.68 K/UL (ref 3.9–12.7)

## 2024-08-07 PROCEDURE — 93010 ELECTROCARDIOGRAM REPORT: CPT | Mod: ,,, | Performed by: INTERNAL MEDICINE

## 2024-08-07 PROCEDURE — 85730 THROMBOPLASTIN TIME PARTIAL: CPT | Mod: ER | Performed by: EMERGENCY MEDICINE

## 2024-08-07 PROCEDURE — 93005 ELECTROCARDIOGRAM TRACING: CPT

## 2024-08-07 PROCEDURE — 94761 N-INVAS EAR/PLS OXIMETRY MLT: CPT | Mod: ER

## 2024-08-07 PROCEDURE — 85025 COMPLETE CBC W/AUTO DIFF WBC: CPT | Mod: ER | Performed by: EMERGENCY MEDICINE

## 2024-08-07 PROCEDURE — 96361 HYDRATE IV INFUSION ADD-ON: CPT | Mod: ER

## 2024-08-07 PROCEDURE — 96360 HYDRATION IV INFUSION INIT: CPT | Mod: 59,ER

## 2024-08-07 PROCEDURE — 85610 PROTHROMBIN TIME: CPT | Mod: ER | Performed by: EMERGENCY MEDICINE

## 2024-08-07 PROCEDURE — 25000003 PHARM REV CODE 250: Mod: ER | Performed by: INTERNAL MEDICINE

## 2024-08-07 PROCEDURE — 80053 COMPREHEN METABOLIC PANEL: CPT | Mod: ER | Performed by: EMERGENCY MEDICINE

## 2024-08-07 PROCEDURE — 83605 ASSAY OF LACTIC ACID: CPT | Mod: ER | Performed by: EMERGENCY MEDICINE

## 2024-08-07 PROCEDURE — 84484 ASSAY OF TROPONIN QUANT: CPT | Mod: ER | Performed by: EMERGENCY MEDICINE

## 2024-08-07 PROCEDURE — 96366 THER/PROPH/DIAG IV INF ADDON: CPT | Mod: ER

## 2024-08-07 PROCEDURE — 83880 ASSAY OF NATRIURETIC PEPTIDE: CPT | Mod: ER | Performed by: EMERGENCY MEDICINE

## 2024-08-07 PROCEDURE — 25000003 PHARM REV CODE 250: Mod: ER | Performed by: EMERGENCY MEDICINE

## 2024-08-07 PROCEDURE — 63600175 PHARM REV CODE 636 W HCPCS: Mod: ER | Performed by: EMERGENCY MEDICINE

## 2024-08-07 PROCEDURE — 99285 EMERGENCY DEPT VISIT HI MDM: CPT | Mod: 25,ER

## 2024-08-07 PROCEDURE — 96365 THER/PROPH/DIAG IV INF INIT: CPT | Mod: ER

## 2024-08-07 PROCEDURE — 86900 BLOOD TYPING SEROLOGIC ABO: CPT | Performed by: EMERGENCY MEDICINE

## 2024-08-07 PROCEDURE — 82962 GLUCOSE BLOOD TEST: CPT | Mod: ER

## 2024-08-07 PROCEDURE — 86850 RBC ANTIBODY SCREEN: CPT | Performed by: EMERGENCY MEDICINE

## 2024-08-07 RX ORDER — LOSARTAN POTASSIUM 25 MG/1
50 TABLET ORAL DAILY
Status: DISCONTINUED | OUTPATIENT
Start: 2024-08-08 | End: 2024-08-07 | Stop reason: HOSPADM

## 2024-08-07 RX ORDER — ASPIRIN 325 MG
325 TABLET ORAL
Status: COMPLETED | OUTPATIENT
Start: 2024-08-07 | End: 2024-08-07

## 2024-08-07 RX ORDER — OXYCODONE HYDROCHLORIDE 5 MG/1
10 TABLET ORAL EVERY 4 HOURS PRN
Status: DISCONTINUED | OUTPATIENT
Start: 2024-08-07 | End: 2024-08-07 | Stop reason: HOSPADM

## 2024-08-07 RX ORDER — ONDANSETRON 4 MG/1
8 TABLET, ORALLY DISINTEGRATING ORAL EVERY 8 HOURS PRN
Status: DISCONTINUED | OUTPATIENT
Start: 2024-08-07 | End: 2024-08-07 | Stop reason: HOSPADM

## 2024-08-07 RX ORDER — NITROGLYCERIN 0.4 MG/1
0.4 TABLET SUBLINGUAL EVERY 5 MIN PRN
Status: DISCONTINUED | OUTPATIENT
Start: 2024-08-07 | End: 2024-08-07 | Stop reason: HOSPADM

## 2024-08-07 RX ORDER — HEPARIN SODIUM,PORCINE/D5W 25000/250
0-40 INTRAVENOUS SOLUTION INTRAVENOUS CONTINUOUS
Status: DISCONTINUED | OUTPATIENT
Start: 2024-08-07 | End: 2024-08-07 | Stop reason: HOSPADM

## 2024-08-07 RX ORDER — CLOPIDOGREL BISULFATE 75 MG/1
75 TABLET ORAL
Status: COMPLETED | OUTPATIENT
Start: 2024-08-07 | End: 2024-08-07

## 2024-08-07 RX ORDER — ATROPINE SULFATE 0.1 MG/ML
0.5 INJECTION INTRAVENOUS
Status: DISCONTINUED | OUTPATIENT
Start: 2024-08-07 | End: 2024-08-07 | Stop reason: HOSPADM

## 2024-08-07 RX ORDER — ASPIRIN 81 MG/1
81 TABLET ORAL DAILY
Status: DISCONTINUED | OUTPATIENT
Start: 2024-08-08 | End: 2024-08-07

## 2024-08-07 RX ORDER — HYDROCODONE BITARTRATE AND ACETAMINOPHEN 5; 325 MG/1; MG/1
1 TABLET ORAL EVERY 4 HOURS PRN
Status: DISCONTINUED | OUTPATIENT
Start: 2024-08-07 | End: 2024-08-07 | Stop reason: HOSPADM

## 2024-08-07 RX ORDER — ACETAMINOPHEN 325 MG/1
650 TABLET ORAL EVERY 4 HOURS PRN
Status: DISCONTINUED | OUTPATIENT
Start: 2024-08-07 | End: 2024-08-07 | Stop reason: HOSPADM

## 2024-08-07 RX ORDER — CLOPIDOGREL BISULFATE 75 MG/1
75 TABLET ORAL DAILY
Status: DISCONTINUED | OUTPATIENT
Start: 2024-08-08 | End: 2024-08-07

## 2024-08-07 RX ORDER — ATORVASTATIN CALCIUM 40 MG/1
80 TABLET, FILM COATED ORAL NIGHTLY
Status: DISCONTINUED | OUTPATIENT
Start: 2024-08-07 | End: 2024-08-07 | Stop reason: HOSPADM

## 2024-08-07 RX ORDER — NAPROXEN SODIUM 220 MG/1
81 TABLET, FILM COATED ORAL DAILY
Status: DISCONTINUED | OUTPATIENT
Start: 2024-08-08 | End: 2024-08-07 | Stop reason: HOSPADM

## 2024-08-07 RX ADMIN — ASPIRIN 325 MG: 325 TABLET ORAL at 02:08

## 2024-08-07 RX ADMIN — NITROGLYCERIN 0.5 INCH: 20 OINTMENT TOPICAL at 05:08

## 2024-08-07 RX ADMIN — CLOPIDOGREL BISULFATE 75 MG: 75 TABLET ORAL at 03:08

## 2024-08-07 RX ADMIN — HEPARIN SODIUM 12 UNITS/KG/HR: 10000 INJECTION, SOLUTION INTRAVENOUS at 04:08

## 2024-08-07 RX ADMIN — SODIUM CHLORIDE 1000 ML: 9 INJECTION, SOLUTION INTRAVENOUS at 04:08

## 2024-08-07 NOTE — ED PROVIDER NOTES
Encounter Date: 8/7/2024       History     Chief Complaint   Patient presents with    Chest Pain     Chest pain, LVAD in place      HPI  Pt reports multiple syncopal events after being stung by bees 6 hours ago. Pt denies chest pain. Brother transported pt to ED for passing out when his X wife came over to the house. Pt denies sob, dyspnea, hives, rash, abd pain, chest pain, HA. Pt is wearing a ZOLL vest. Recent Stent, Cardiac.      Review of patient's allergies indicates:  No Known Allergies  Past Medical History:   Diagnosis Date    Chronic pain     associated with injury     Past Surgical History:   Procedure Laterality Date    BRAIN SURGERY      EYE SURGERY      LEFT HEART CATHETERIZATION Left 7/31/2024    Procedure: Left heart cath;  Surgeon: Josh Gonzalez MD;  Location: [facility] CATH LAB;  Service: Cardiology;  Laterality: Left;    PERCUTANEOUS TRANSLUMINAL BALLOON ANGIOPLASTY OF CORONARY ARTERY  7/31/2024    Procedure: Angioplasty-coronary;  Surgeon: Josh Gonzalez MD;  Location: [facility] CATH LAB;  Service: Cardiology;;    SHOULDER SURGERY      post trauma fell 19 feet    STENT, DRUG ELUTING, SINGLE VESSEL, CORONARY  7/31/2024    Procedure: Stent, Drug Eluting, Single Vessel, Coronary;  Surgeon: Josh Gonzalez MD;  Location: [facility] CATH LAB;  Service: Cardiology;;     Family History   Problem Relation Name Age of Onset    Peripheral vascular disease Mother      Hypertension Mother      Cancer Father       Social History     Tobacco Use    Smoking status: Every Day     Current packs/day: 2.00     Types: Cigarettes    Smokeless tobacco: Never   Substance Use Topics    Alcohol use: No    Drug use: Yes     Review of Systems   Constitutional:  Negative for fever.   HENT:  Negative for sore throat.    Respiratory:  Negative for shortness of breath.    Cardiovascular:  Negative for chest pain.   Gastrointestinal:  Negative for nausea.   Genitourinary:  Negative for dysuria.   Musculoskeletal:  Negative for back pain.    Skin:  Negative for rash.   Neurological:  Positive for syncope. Negative for weakness.   Hematological:  Does not bruise/bleed easily.       Physical Exam     Initial Vitals   BP Pulse Resp Temp SpO2   08/07/24 1449 08/07/24 1444 08/07/24 1457 08/07/24 1457 08/07/24 1457   118/76 (!) 55 20 98.9 °F (37.2 °C) 100 %      MAP       --                Physical Exam    Nursing note and vitals reviewed.  Constitutional: He appears well-developed and well-nourished.   HENT:   Head: Normocephalic and atraumatic.   Mouth/Throat: Oropharynx is clear and moist.   Eyes: Conjunctivae and EOM are normal. Pupils are equal, round, and reactive to light.   Neck: Neck supple.   Normal range of motion.  Cardiovascular:  Normal rate, regular rhythm and normal heart sounds.           Pulmonary/Chest: Breath sounds normal.   Abdominal: Abdomen is soft. Bowel sounds are normal.   Musculoskeletal:         General: Normal range of motion.      Cervical back: Normal range of motion and neck supple.     Neurological: He is alert and oriented to person, place, and time. He has normal strength.   Skin: Skin is warm and dry.   Psychiatric: He has a normal mood and affect. Thought content normal.         ED Course   Critical Care    Date/Time: 8/7/2024 4:18 PM    Performed by: Paco Bojorquez MD  Authorized by: Millie Carroll MD  Direct patient critical care time: 12 minutes  Additional history critical care time: 8 minutes  Ordering / reviewing critical care time: 10 minutes  Documentation critical care time: 12 minutes  Consulting other physicians critical care time: 6 minutes  Total critical care time (exclusive of procedural time) : 48 minutes  Critical care time was exclusive of separately billable procedures and treating other patients.  Critical care was necessary to treat or prevent imminent or life-threatening deterioration of the following conditions: cardiac failure.  Critical care was time spent personally by me on the  following activities: blood draw for specimens, development of treatment plan with patient or surrogate, discussions with consultants, evaluation of patient's response to treatment, examination of patient, obtaining history from patient or surrogate, ordering and performing treatments and interventions, ordering and review of laboratory studies, ordering and review of radiographic studies, pulse oximetry, re-evaluation of patient's condition and review of old charts.        Labs Reviewed   CBC W/ AUTO DIFFERENTIAL - Abnormal       Result Value    WBC 17.68 (*)     RBC 6.49 (*)     Hemoglobin 19.0 (*)     Hematocrit 54.2 (*)     MCV 84      MCH 29.3      MCHC 35.1      RDW 14.1      Platelets 279      MPV 10.1      Immature Granulocytes 0.6 (*)     Gran # (ANC) 14.9 (*)     Immature Grans (Abs) 0.10 (*)     Lymph # 1.1      Mono # 1.3 (*)     Eos # 0.1      Baso # 0.08      nRBC 0      Gran % 84.4 (*)     Lymph % 6.3 (*)     Mono % 7.4      Eosinophil % 0.8      Basophil % 0.5      Differential Method Automated     COMPREHENSIVE METABOLIC PANEL - Abnormal    Sodium 140      Potassium 4.5      Chloride 105      CO2 20 (*)     Glucose 153 (*)     BUN 30 (*)     Creatinine 2.3 (*)     Calcium 10.0      Total Protein 8.4      Albumin 4.6      Total Bilirubin 1.1 (*)     Alkaline Phosphatase 130      AST 42 (*)     ALT 52 (*)     eGFR 34.4 (*)     Anion Gap 15     TROPONIN I - Abnormal    Troponin I 0.357 (*)    LACTIC ACID, PLASMA    Lactate (Lactic Acid) 1.6     APTT    aPTT 28.6     PROTIME-INR    Prothrombin Time 11.3      INR 1.0     B-TYPE NATRIURETIC PEPTIDE    BNP 95     URINALYSIS, REFLEX TO URINE CULTURE   TROPONIN I   TROPONIN I   POCT GLUCOSE, HAND-HELD DEVICE   TYPE & SCREEN     EKG Readings: (Independently Interpreted)   Rhythm: Sinus Bradycardia. Heart Rate: 55. Ectopy: No Ectopy. ST Segment Elevation: V3, V4 and V5. T Waves Flipped: V3, V4, V5, V6, II and III.       Imaging Results              CT Head  Without Contrast (Final result)  Result time 08/07/24 15:34:25      Final result by Cory Nieto MD (08/07/24 15:34:25)                   Impression:      No acute abnormality.    mucosal thickening within the left maxillary sinus.  Question small fluid level within the left maxillary sinus which could reflect sinusitis.    All CT scans at this facility use dose modulation, iterative reconstruction, and/or weight based dosing when appropriate to reduce radiation dose to as low as reasonable achievable.      Electronically signed by: Cory Nieto MD  Date:    08/07/2024  Time:    15:34               Narrative:    EXAMINATION:  CT HEAD WITHOUT CONTRAST    CLINICAL HISTORY:  Syncope, recurrent; Syncope and collapse    TECHNIQUE:  Low dose axial CT images obtained throughout the head without intravenous contrast. Sagittal and coronal reconstructions were performed.    All CT scans at this facility use dose modulation, iterative reconstruction, and/or weight based dosing when appropriate to reduce radiation dose to as low as reasonable achievable.    COMPARISON:  09/22/2019    FINDINGS:  Intracranial compartment:    The brain parenchyma appears normal. No parenchymal mass, hemorrhage, edema or major vascular distribution infarct.    Ventricles and sulci are normal in size for age without evidence of hydrocephalus.    No extra-axial blood or fluid collections.    Skull/extracranial contents (limited evaluation): No fracture.  Significant mucosal thickening within the left maxillary sinus.  Question small fluid level within the left maxillary sinus which could reflect sinusitis.  Left mastoidectomy.  Mastoid air cells and paranasal sinuses are essentially clear.                                       X-Ray Chest 1 View (Final result)  Result time 08/07/24 15:38:22      Final result by Cory Nieto MD (08/07/24 15:38:22)                   Impression:      No acute process seen.      Electronically signed by: Cory  MD Gerson  Date:    08/07/2024  Time:    15:38               Narrative:    EXAMINATION:  XR CHEST 1 VIEW    CLINICAL HISTORY:  Syncope and collapse    FINDINGS:  Single view of the chest.  Comparison 07/31/2024    Cardiac silhouette is normal.  The lungs demonstrate no evidence of active disease.  No evidence of pleural effusion or pneumothorax.  Bones appear intact.  Moderate degenerative changes and moderate atherosclerotic disease.  Chronic left clavicular fracture.                                       Medications   atropine injection 0.5 mg (has no administration in time range)   nitroGLYCERIN SL tablet 0.4 mg (has no administration in time range)   acetaminophen tablet 650 mg (has no administration in time range)   HYDROcodone-acetaminophen 5-325 mg per tablet 1 tablet (has no administration in time range)   oxyCODONE immediate release tablet 10 mg (has no administration in time range)   ondansetron disintegrating tablet 8 mg (has no administration in time range)   aspirin chewable tablet 81 mg (has no administration in time range)   nitroGLYCERIN 2% TD oint ointment 0.5 inch (has no administration in time range)   atorvastatin tablet 80 mg (has no administration in time range)   losartan tablet 50 mg (has no administration in time range)   sodium chloride 0.9% bolus 1,000 mL 1,000 mL (1,000 mLs Intravenous New Bag 8/7/24 1611)   heparin 25,000 units in dextrose 5% 250 mL (100 units/mL) infusion LOW INTENSITY nomogram - [facility] (12 Units/kg/hr × 85.3 kg Intravenous New Bag 8/7/24 1614)   heparin 25,000 units in dextrose 5% (100 units/ml) IV bolus from bag LOW INTENSITY nomogram - [facility] (has no administration in time range)   heparin 25,000 units in dextrose 5% (100 units/ml) IV bolus from bag LOW INTENSITY nomogram - [facility] (has no administration in time range)   aspirin tablet 325 mg (325 mg Oral Given 8/7/24 1451)   clopidogreL tablet 75 mg (75 mg Oral Given 8/7/24 1532)   heparin 25,000 units in dextrose 5% (100  units/ml) IV bolus from bag LOW INTENSITY nomogram - [facility] (4,000 Units Intravenous Bolus from Bag 8/7/24 1615)     Medical Decision Making  DDx STEMI, ACS, Syncope, Anaphylaxis, CHRISTOPHER, electrolyte abnormality    Problems Addressed:  Abnormal EKG: chronic illness or injury  CHRISTOPHER (acute kidney injury): undiagnosed new problem with uncertain prognosis  Elevated troponin: acute illness or injury  NSTEMI (non-ST elevated myocardial infarction): undiagnosed new problem with uncertain prognosis    Amount and/or Complexity of Data Reviewed  Labs: ordered.  Radiology: ordered.  ECG/medicine tests: ordered and independent interpretation performed. Decision-making details documented in ED Course.  Discussion of management or test interpretation with external provider(s): Cardiology Consult- d/w Dr Patrick ROGERS, possible ischemic c more pronounced t wave inversion. Admit to  and medical tx.    Risk  OTC drugs.  Prescription drug management.  Decision regarding hospitalization.    4:20 PM Discussed lab/imaging studies with patient and the need for further evaluation/admission for NSTEMI, CHRISTOPHER, Syncope. Pt verbalized understanding that this is a stand alone ER and we are unable to admit at this facility. Pt will be transferred to [facility] via [facility] with care en route to include cm. I discussed this case with hs and care was accepted by Dr Carroll.                                    Clinical Impression:  Final diagnoses:  [R55] Syncope  [N17.9] CHRISTOPHER (acute kidney injury)  [R79.89] Elevated troponin  [R94.31] Abnormal EKG  [I21.4] NSTEMI (non-ST elevated myocardial infarction) (Primary)          ED Disposition Condition    Observation Stable                Paco Bojorquez MD  08/07/24 9838

## 2024-08-07 NOTE — Clinical Note
Diagnosis: Syncope [157684]   Future Attending Provider: KRYSTYNA JENKINS [[id number]]   Is the patient being admitted to ED TeleObservation?: No   Is the Patient being Admitted to ED Teleobservation? If the answer is No: Patient acuity level too high   Special Needs:: No Special Needs [1]

## 2024-08-07 NOTE — ED PROVIDER NOTES
Patient informed me that he would like to sign out against medical advice due to relationship issues.  He states he has an appointment with his cardiologist tomorrow.  He understands that he has been accepted at [facility] for admission to see Cardiology.  He understands that leaving may lead to death, permanent disability, loss of limb, loss of sexual function, incomplete diagnosis and treatment.  He is alert and oriented x3 and does not meet any criteria for involuntary admission.  He has not received any mind-altering medications.     Leslye Luis, DO  08/07/24 0227

## 2024-08-07 NOTE — BRIEF OP NOTE
<[facility] - Emergency Dept  Cardiology Department  Operative Note    SUMMARY     Date of Procedure:      Procedure: * No surgery found *     * Surgery not found *    Assisting Surgeon: None    Pre-Operative Diagnosis: * No surgery found *    Post-Operative Diagnosis: * No surgery found *    Anesthesia: * No surgery found *    Technical Procedures Used: LHC, PCI of RCA, Dx, NSTEMI    Description of the Findings of the Procedure: proximal to mid RCA with 60-90% lesion PCI with 3 x 28 GUCCI to 0%, NML EF    Significant Surgical Tasks Conducted by the Assistant(s), if Applicable: none    Complications: No    Estimated Blood Loss (EBL): < 50 cc           Implants: * No surgical log found *    Specimens:   Specimen (24h ago, onward)      None                    Condition: Good    Disposition: PACU - hemodynamically stable.    Attestation: I was present and scrubbed for the entire procedure.

## 2024-08-08 ENCOUNTER — OFFICE VISIT (OUTPATIENT)
Dept: INTERNAL MEDICINE | Facility: CLINIC | Age: 49
End: 2024-08-08
Payer: COMMERCIAL

## 2024-08-08 ENCOUNTER — HOSPITAL ENCOUNTER (OUTPATIENT)
Dept: CARDIOLOGY | Facility: HOSPITAL | Age: 49
Discharge: HOME | End: 2024-08-08
Payer: COMMERCIAL

## 2024-08-08 VITALS
HEIGHT: 67 IN | BODY MASS INDEX: 30.31 KG/M2 | TEMPERATURE: 97 F | HEART RATE: 90 BPM | OXYGEN SATURATION: 98 % | SYSTOLIC BLOOD PRESSURE: 106 MMHG | DIASTOLIC BLOOD PRESSURE: 66 MMHG | WEIGHT: 193.13 LBS

## 2024-08-08 DIAGNOSIS — Z72.0 TOBACCO USE: ICD-10-CM

## 2024-08-08 DIAGNOSIS — I21.02 ST ELEVATION MYOCARDIAL INFARCTION INVOLVING LEFT ANTERIOR DESCENDING (LAD) CORONARY ARTERY: ICD-10-CM

## 2024-08-08 DIAGNOSIS — E78.5 HYPERLIPIDEMIA, UNSPECIFIED HYPERLIPIDEMIA TYPE: ICD-10-CM

## 2024-08-08 DIAGNOSIS — I50.9 CHRONIC CONGESTIVE HEART FAILURE, UNSPECIFIED HEART FAILURE TYPE: ICD-10-CM

## 2024-08-08 DIAGNOSIS — R56.9 SEIZURE: ICD-10-CM

## 2024-08-08 DIAGNOSIS — I10 BENIGN ESSENTIAL HTN: Primary | ICD-10-CM

## 2024-08-08 LAB
ABO + RH BLD: NORMAL
BLD GP AB SCN CELLS X3 SERPL QL: NORMAL
OHS QRS DURATION: 78 MS
OHS QTC CALCULATION: 443 MS
SPECIMEN OUTDATE: NORMAL

## 2024-08-08 PROCEDURE — 93010 ELECTROCARDIOGRAM REPORT: CPT | Mod: ,,, | Performed by: INTERNAL MEDICINE

## 2024-08-08 PROCEDURE — 99999 PR PBB SHADOW E&M-EST. PATIENT-LVL IV: CPT | Mod: PBBFAC,,, | Performed by: STUDENT IN AN ORGANIZED HEALTH CARE EDUCATION/TRAINING PROGRAM

## 2024-08-08 PROCEDURE — 93005 ELECTROCARDIOGRAM TRACING: CPT

## 2024-08-08 RX ORDER — ATORVASTATIN CALCIUM 80 MG/1
80 TABLET, FILM COATED ORAL NIGHTLY
Qty: 90 TABLET | Refills: 3 | Status: SHIPPED | OUTPATIENT
Start: 2024-08-08 | End: 2025-08-08

## 2024-08-09 ENCOUNTER — TELEPHONE (OUTPATIENT)
Dept: CARDIOLOGY | Facility: HOSPITAL | Age: 49
End: 2024-08-09
Payer: COMMERCIAL

## 2024-08-09 LAB
OHS QRS DURATION: 92 MS
OHS QTC CALCULATION: 464 MS

## 2024-08-09 NOTE — TELEPHONE ENCOUNTER
Contacted pt and verified he is taking all CV meds. He confirmed his appt w/ ROBERT. UMER Sams on 08/13/24. Pt vu w/o q/c    ---- UMER Mccoy 08/09/24 1:53 PM  Please make sure patient is taking all prescribed CV meds.    Route back.    Thanks

## 2024-08-16 ENCOUNTER — LAB VISIT (OUTPATIENT)
Dept: LAB | Facility: HOSPITAL | Age: 49
End: 2024-08-16
Attending: PHYSICIAN ASSISTANT
Payer: COMMERCIAL

## 2024-08-16 ENCOUNTER — OFFICE VISIT (OUTPATIENT)
Dept: CARDIOLOGY | Facility: CLINIC | Age: 49
End: 2024-08-16
Payer: COMMERCIAL

## 2024-08-16 VITALS
HEART RATE: 70 BPM | SYSTOLIC BLOOD PRESSURE: 130 MMHG | WEIGHT: 189.63 LBS | OXYGEN SATURATION: 98 % | BODY MASS INDEX: 29.69 KG/M2 | DIASTOLIC BLOOD PRESSURE: 90 MMHG

## 2024-08-16 DIAGNOSIS — I25.5 ISCHEMIC CARDIOMYOPATHY: Primary | ICD-10-CM

## 2024-08-16 DIAGNOSIS — R50.9 FEVER, UNSPECIFIED FEVER CAUSE: ICD-10-CM

## 2024-08-16 DIAGNOSIS — Z72.0 TOBACCO ABUSE: ICD-10-CM

## 2024-08-16 DIAGNOSIS — I21.9 MYOCARDIAL INFARCTION, UNSPECIFIED MI TYPE, UNSPECIFIED ARTERY: ICD-10-CM

## 2024-08-16 DIAGNOSIS — E78.5 HYPERLIPIDEMIA, UNSPECIFIED HYPERLIPIDEMIA TYPE: ICD-10-CM

## 2024-08-16 DIAGNOSIS — R94.31 ABNORMAL EKG: ICD-10-CM

## 2024-08-16 DIAGNOSIS — I10 PRIMARY HYPERTENSION: ICD-10-CM

## 2024-08-16 DIAGNOSIS — I25.5 ISCHEMIC CARDIOMYOPATHY: ICD-10-CM

## 2024-08-16 LAB
ANION GAP SERPL CALC-SCNC: 11 MMOL/L (ref 8–16)
BUN SERPL-MCNC: 22 MG/DL (ref 6–20)
CALCIUM SERPL-MCNC: 9.8 MG/DL (ref 8.7–10.5)
CHLORIDE SERPL-SCNC: 106 MMOL/L (ref 95–110)
CO2 SERPL-SCNC: 24 MMOL/L (ref 23–29)
CREAT SERPL-MCNC: 1.4 MG/DL (ref 0.5–1.4)
EST. GFR  (NO RACE VARIABLE): >60 ML/MIN/1.73 M^2
GLUCOSE SERPL-MCNC: 94 MG/DL (ref 70–110)
POTASSIUM SERPL-SCNC: 4.2 MMOL/L (ref 3.5–5.1)
SODIUM SERPL-SCNC: 141 MMOL/L (ref 136–145)

## 2024-08-16 PROCEDURE — 80048 BASIC METABOLIC PNL TOTAL CA: CPT | Performed by: PHYSICIAN ASSISTANT

## 2024-08-16 PROCEDURE — 36415 COLL VENOUS BLD VENIPUNCTURE: CPT | Performed by: PHYSICIAN ASSISTANT

## 2024-08-16 PROCEDURE — 99999 PR PBB SHADOW E&M-EST. PATIENT-LVL III: CPT | Mod: PBBFAC,,, | Performed by: PHYSICIAN ASSISTANT

## 2024-08-16 RX ORDER — PRASUGREL 10 MG/1
10 TABLET, FILM COATED ORAL DAILY
Qty: 30 TABLET | Refills: 11 | Status: SHIPPED | OUTPATIENT
Start: 2024-08-16 | End: 2025-08-16

## 2024-08-16 RX ORDER — ASPIRIN 81 MG/1
81 TABLET ORAL DAILY
Qty: 30 TABLET | Refills: 11 | Status: SHIPPED | OUTPATIENT
Start: 2024-08-16 | End: 2025-08-16

## 2024-08-16 NOTE — PROGRESS NOTES
Subjective   Patient ID:  Toño Nelson is a 49 y.o. male who presents for follow-up of No chief complaint on file.      HPI  Mr. Nelson is a 49 year old male patient whose current medical conditions include long time tobacco abuse,  HTN, hyperlipidemia, prior CVA (by CT scan) who presents today for hospital follow-up. Patient recently admitted to [facility] with subacute anterior MI. He underwent LHC with successful of LAD, EF 25%. His medications were optimized, LifeVest was arranged and he was discharged home. He returns today and states he is doing clinically well. Unfortunately was readmitted to the hospital on 8/7/24 due to multiple hornet stings. CV wise, remains stable. No CP, heaviness, or tightness. No SOB/SCHULTZ. No LH, dizziness, palpitations, near syncope, or syncope. BP borderline today. Patient reports compliance with his medications. No radial access site complaints. He has cut back on smoking, using nicotine patch. Wearing LifeVest most of the time.         Review of Systems   Constitutional: Negative for chills, decreased appetite, fever and malaise/fatigue.   HENT:  Negative for congestion, hoarse voice and sore throat.    Eyes:  Negative for blurred vision and discharge.   Cardiovascular:  Negative for chest pain, claudication, cyanosis, dyspnea on exertion, irregular heartbeat, leg swelling, near-syncope, orthopnea, palpitations and paroxysmal nocturnal dyspnea.   Respiratory:  Negative for cough, hemoptysis, shortness of breath, snoring, sputum production and wheezing.    Endocrine: Negative for cold intolerance and heat intolerance.   Hematologic/Lymphatic: Negative for bleeding problem. Does not bruise/bleed easily.   Skin:  Negative for rash.   Musculoskeletal:  Negative for arthritis, back pain, joint pain, joint swelling, muscle cramps, muscle weakness and myalgias.   Gastrointestinal:  Negative for abdominal pain, constipation, diarrhea, heartburn, melena and nausea.   Genitourinary:   Negative for hematuria.   Neurological:  Negative for dizziness, focal weakness, headaches, light-headedness, loss of balance, numbness, paresthesias, seizures and weakness.   Psychiatric/Behavioral:  Negative for memory loss. The patient does not have insomnia.    Allergic/Immunologic: Negative for hives.     BP (!) 130/90 (BP Location: Left arm, Patient Position: Sitting)   Pulse 70   Wt 86 kg (189 lb 9.5 oz)   SpO2 98%   BMI 29.69 kg/m²       Objective     Physical Exam  Vitals and nursing note reviewed.   Constitutional:       General: He is not in acute distress.     Appearance: Normal appearance. He is well-developed. He is not diaphoretic.   HENT:      Head: Normocephalic and atraumatic.   Eyes:      General:         Right eye: No discharge.         Left eye: No discharge.      Pupils: Pupils are equal, round, and reactive to light.   Cardiovascular:      Rate and Rhythm: Normal rate and regular rhythm.      Heart sounds: Normal heart sounds, S1 normal and S2 normal. No murmur heard.  Pulmonary:      Effort: Pulmonary effort is normal. No respiratory distress.      Breath sounds: Normal breath sounds.   Abdominal:      General: There is no distension.   Musculoskeletal:      Right lower leg: No edema.      Left lower leg: No edema.   Skin:     General: Skin is warm and dry.      Findings: No erythema.   Neurological:      General: No focal deficit present.      Mental Status: He is alert and oriented to person, place, and time.   Psychiatric:         Mood and Affect: Mood normal.         Behavior: Behavior normal.         Thought Content: Thought content normal.     TTE Results 8/1/24  Summary  Show Result Comparison     Left Ventricle: The left ventricle is normal in size. Normal wall thickness. There is concentric hypertrophy. Regional wall motion abnormalities present. There is reduced systolic function. Ejection fraction by visual approximation is 35%. Grade I diastolic dysfunction.    Right  Ventricle: Normal right ventricular cavity size. Wall thickness is normal. Systolic function is normal.    Pulmonary Artery: The estimated pulmonary artery systolic pressure is 18 mmHg.    IVC/SVC: Normal venous pressure at 3 mmHg.    [facility] Results   Summary         Successful PCI occluded mid LAD treated with 2.25 x 20 mm Synergy GUCCI with 0% residual stenosis for subacute anterior STEMI presentation.    EF 25% with WMA.    Recommendations:  Maximize med tx for CAD/CHF and risk factor modifications.  Echocardiogram. Arrange Life Vest until EF recovery (if EF recovery occurs since pt had very delayed presentation of his recent anterior infarct -- see H & P for details).     Assessment and Plan     1. Ischemic cardiomyopathy    2. Abnormal EKG    3. Myocardial infarction, unspecified MI type, unspecified artery    4. Fever, unspecified fever cause    5. Tobacco abuse    6. Hyperlipidemia, unspecified hyperlipidemia type    7. Primary hypertension      Patient presents for f/u. Doing clinically well. Stable CV wise. No angina. CHF compensated. BP borderline, check BMP today will plan on increasing Entresto if stable. Continue other CV meds/mgmt for now.  Plan:  -BMP today with phone review  -Continue same CV meds/mgmt for now, will increase Entresto if BMP stable  -Cardiac low salt diet  -Wear LifeVest  -Will speak with MD regarding back to work status  -Follow-up TBA

## 2024-08-22 DIAGNOSIS — I50.9 CHRONIC CONGESTIVE HEART FAILURE, UNSPECIFIED HEART FAILURE TYPE: Primary | ICD-10-CM

## 2024-08-22 RX ORDER — METOPROLOL SUCCINATE 50 MG/1
50 TABLET, EXTENDED RELEASE ORAL DAILY
Qty: 90 TABLET | Refills: 3 | Status: SHIPPED | OUTPATIENT
Start: 2024-08-22 | End: 2025-08-22

## 2024-08-23 ENCOUNTER — TELEPHONE (OUTPATIENT)
Dept: CARDIOLOGY | Facility: CLINIC | Age: 49
End: 2024-08-23
Payer: COMMERCIAL

## 2024-08-23 NOTE — TELEPHONE ENCOUNTER
Pt is asking if he can be released to go back to work?    Please advise:      ----- Message from Shanel Smith sent at 8/23/2024  2:58 PM CDT -----  Contact: Toño  .Type:  Needs Medical Advice    Who Called:  Toño     Would the patient rather a call back or a response via [facility]?  Call back   Best Call Back Number:  [phone] (home)    Additional Information:  pt would like to discuss work release    Thanks

## 2024-08-26 ENCOUNTER — TELEPHONE (OUTPATIENT)
Dept: CARDIOLOGY | Facility: CLINIC | Age: 49
End: 2024-08-26
Payer: COMMERCIAL

## 2024-08-26 NOTE — TELEPHONE ENCOUNTER
Keli Sams PA-C  You3 days ago       Yes I previously responded to this in a telephone call. Was he not called?    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   The patient has been notified of this information and all questions answered.  Will upload letter to [employer]

## 2024-08-26 NOTE — TELEPHONE ENCOUNTER
Needs letter printed out - will  at [employer] 8/27/2024  Beryl will have it      ----- Message from Radha Mcdonald sent at 8/26/2024  1:55 PM CDT -----  Contact: Toño Mckeon is calling in regards to getting a medical release letter stating limitations  and call when ready for  [phone]           Thanks  MOIRA

## 2024-08-27 NOTE — TELEPHONE ENCOUNTER
The patient has been notified of this information and all questions answered.        ----- Message from Keli Sams PA-C sent at 8/22/2024  2:13 PM CDT -----  Please phone patient. Labs reviewed. Creatinine 1.4. Continue same dose of Entresto. I switched his Metoprolol to the extended release version, so he will now take 50 mg once daily. Please make him aware.      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    Thanks

## 2024-09-09 ENCOUNTER — TELEPHONE (OUTPATIENT)
Dept: CARDIOLOGY | Facility: CLINIC | Age: 49
End: 2024-09-09
Payer: COMMERCIAL

## 2024-09-09 NOTE — TELEPHONE ENCOUNTER
Called pt and got him rescheduled for 09/10/24. Pt vu w/o q/c    ----- Message from Shanel Smith sent at 9/9/2024  7:03 AM CDT -----  Contact: Toño  .Type:  Sooner Apoointment Request    Caller is requesting a sooner appointment.      Name of Caller: Toño   When is the first available appointment? 10/07  Symptoms: follow up   Would the patient rather a call back or a response via [facility]? Call back   Best Call Back Number: [phone] (home)    Additional Information:  Pt is calling in regard to not being able to make the appt scheduled on today at 8am due to transportation issues and would like to get the follow up appt scheduled before October. Pt would like to be scheduled either later on today or some times this if possible.       Thanks

## 2024-09-10 ENCOUNTER — OFFICE VISIT (OUTPATIENT)
Dept: CARDIOLOGY | Facility: CLINIC | Age: 49
End: 2024-09-10
Payer: COMMERCIAL

## 2024-09-10 VITALS
OXYGEN SATURATION: 98 % | WEIGHT: 187.81 LBS | SYSTOLIC BLOOD PRESSURE: 118 MMHG | DIASTOLIC BLOOD PRESSURE: 84 MMHG | HEART RATE: 61 BPM | BODY MASS INDEX: 29.48 KG/M2 | HEIGHT: 67 IN

## 2024-09-10 DIAGNOSIS — Z72.0 TOBACCO ABUSE: ICD-10-CM

## 2024-09-10 DIAGNOSIS — I25.5 ISCHEMIC CARDIOMYOPATHY: ICD-10-CM

## 2024-09-10 DIAGNOSIS — I50.9 CHRONIC CONGESTIVE HEART FAILURE, UNSPECIFIED HEART FAILURE TYPE: Primary | ICD-10-CM

## 2024-09-10 DIAGNOSIS — I10 PRIMARY HYPERTENSION: ICD-10-CM

## 2024-09-10 DIAGNOSIS — E78.5 HYPERLIPIDEMIA, UNSPECIFIED HYPERLIPIDEMIA TYPE: ICD-10-CM

## 2024-09-10 DIAGNOSIS — R94.31 ABNORMAL EKG: ICD-10-CM

## 2024-09-10 DIAGNOSIS — I21.9 MYOCARDIAL INFARCTION, UNSPECIFIED MI TYPE, UNSPECIFIED ARTERY: ICD-10-CM

## 2024-09-10 PROCEDURE — 1159F MED LIST DOCD IN RCRD: CPT | Mod: CPTII,S$GLB,, | Performed by: PHYSICIAN ASSISTANT

## 2024-09-10 PROCEDURE — 99214 OFFICE O/P EST MOD 30 MIN: CPT | Mod: S$GLB,,, | Performed by: PHYSICIAN ASSISTANT

## 2024-09-10 PROCEDURE — 3044F HG A1C LEVEL LT 7.0%: CPT | Mod: CPTII,S$GLB,, | Performed by: PHYSICIAN ASSISTANT

## 2024-09-10 PROCEDURE — 3074F SYST BP LT 130 MM HG: CPT | Mod: CPTII,S$GLB,, | Performed by: PHYSICIAN ASSISTANT

## 2024-09-10 PROCEDURE — 4010F ACE/ARB THERAPY RXD/TAKEN: CPT | Mod: CPTII,S$GLB,, | Performed by: PHYSICIAN ASSISTANT

## 2024-09-10 PROCEDURE — 3008F BODY MASS INDEX DOCD: CPT | Mod: CPTII,S$GLB,, | Performed by: PHYSICIAN ASSISTANT

## 2024-09-10 PROCEDURE — 99999 PR PBB SHADOW E&M-EST. PATIENT-LVL III: CPT | Mod: PBBFAC,,, | Performed by: PHYSICIAN ASSISTANT

## 2024-09-10 PROCEDURE — 3079F DIAST BP 80-89 MM HG: CPT | Mod: CPTII,S$GLB,, | Performed by: PHYSICIAN ASSISTANT

## 2024-09-10 RX ORDER — PRASUGREL 10 MG/1
10 TABLET, FILM COATED ORAL DAILY
Qty: 30 TABLET | Refills: 11 | Status: SHIPPED | OUTPATIENT
Start: 2024-09-10 | End: 2025-09-10

## 2024-09-10 NOTE — PROGRESS NOTES
Subjective   Patient ID:  Toño Nelson is a 49 y.o. male who presents for follow-up of CHF/med check      HPI  Mr. Nelson is a 49 year old male patient whose current medical conditions include long time tobacco abuse,  HTN, hyperlipidemia, prior CVA (by CT scan), combined CHF/ICM, and CAD s/p PCI of LAD who presents today for follow-up. He returns today and states he is doing well. No CV complaints. Denies any CP, heaviness, or tightness. No SOB/SCHULTZ. No LH, dizziness, palpitations, near syncope, or syncope. No s/s suggestive of CHF. BP stable and controlled. Patient is compliant with his medications, but was accidentally taking metoprolol tartrate and succinate, he was advised to d/c metoprolol tartrate. Reports LifeVest is cumbersome, but willing to wear it until we repeat TTE next week.   Requesting to go back to work.     Review of Systems   Constitutional: Negative for chills, decreased appetite, fever and malaise/fatigue.   HENT:  Negative for congestion, hoarse voice and sore throat.    Eyes:  Negative for blurred vision and discharge.   Cardiovascular:  Negative for chest pain, claudication, cyanosis, dyspnea on exertion, irregular heartbeat, leg swelling, near-syncope, orthopnea, palpitations and paroxysmal nocturnal dyspnea.   Respiratory:  Negative for cough, hemoptysis, shortness of breath, snoring, sputum production and wheezing.    Endocrine: Negative for cold intolerance and heat intolerance.   Hematologic/Lymphatic: Negative for bleeding problem. Does not bruise/bleed easily.   Skin:  Negative for rash.   Musculoskeletal:  Negative for arthritis, back pain, joint pain, joint swelling, muscle cramps, muscle weakness and myalgias.   Gastrointestinal:  Negative for abdominal pain, constipation, diarrhea, heartburn, melena and nausea.   Genitourinary:  Negative for hematuria.   Neurological:  Negative for dizziness, focal weakness, headaches, light-headedness, loss of balance, numbness,  paresthesias, seizures and weakness.   Psychiatric/Behavioral:  Negative for memory loss. The patient does not have insomnia.    Allergic/Immunologic: Negative for hives.          Objective     Physical Exam  Vitals and nursing note reviewed.   Constitutional:       General: He is not in acute distress.     Appearance: Normal appearance. He is well-developed.   HENT:      Head: Normocephalic and atraumatic.   Eyes:      General:         Right eye: No discharge.         Left eye: No discharge.      Pupils: Pupils are equal, round, and reactive to light.   Cardiovascular:      Rate and Rhythm: Normal rate and regular rhythm.      Heart sounds: Normal heart sounds, S1 normal and S2 normal. No murmur heard.  Pulmonary:      Effort: Pulmonary effort is normal. No respiratory distress.   Abdominal:      General: There is no distension.   Musculoskeletal:      Right lower leg: No edema.      Left lower leg: No edema.   Skin:     General: Skin is warm and dry.      Findings: No erythema.   Neurological:      Mental Status: He is alert and oriented to person, place, and time.   Psychiatric:         Mood and Affect: Mood normal.         Behavior: Behavior normal.         Thought Content: Thought content normal.       TTE Results 8/1/24  Summary  Show Result Comparison     Left Ventricle: The left ventricle is normal in size. Normal wall thickness. There is concentric hypertrophy. Regional wall motion abnormalities present. There is reduced systolic function. Ejection fraction by visual approximation is 35%. Grade I diastolic dysfunction.    Right Ventricle: Normal right ventricular cavity size. Wall thickness is normal. Systolic function is normal.    Pulmonary Artery: The estimated pulmonary artery systolic pressure is 18 mmHg.    IVC/SVC: Normal venous pressure at 3 mmHg.        Assessment and Plan     1. Chronic congestive heart failure, unspecified heart failure type    2. Abnormal EKG    3. Hyperlipidemia, unspecified  hyperlipidemia type    4. Primary hypertension    5. Ischemic cardiomyopathy    6. Myocardial infarction, unspecified MI type, unspecified artery    7. Tobacco abuse      Patient presents for f/u. Doing clinically well. No CHF symptoms. No angina. Advised to stop metoprolol tartrate, continue metoprolol succinate. Continue other CV meds, importance of DAPT reinforced. Anxious to go back to work. TTE and labs next week.   Plan:  -STOP METOPROLOL TARTRATE; continue METOPROLOL SUCCINATE  -Continue other CV meds  -Smoking cessation  -Wear LifeVest  -TTE, labs next week, phone review  -Will decide on f/u after

## 2024-09-17 ENCOUNTER — TELEPHONE (OUTPATIENT)
Dept: CARDIOLOGY | Facility: CLINIC | Age: 49
End: 2024-09-17
Payer: COMMERCIAL

## 2024-09-17 ENCOUNTER — HOSPITAL ENCOUNTER (OUTPATIENT)
Dept: CARDIOLOGY | Facility: HOSPITAL | Age: 49
Discharge: HOME | End: 2024-09-17
Attending: PHYSICIAN ASSISTANT
Payer: COMMERCIAL

## 2024-09-17 VITALS
HEART RATE: 67 BPM | SYSTOLIC BLOOD PRESSURE: 118 MMHG | BODY MASS INDEX: 29.35 KG/M2 | WEIGHT: 187 LBS | HEIGHT: 67 IN | DIASTOLIC BLOOD PRESSURE: 84 MMHG

## 2024-09-17 DIAGNOSIS — I50.9 CHRONIC CONGESTIVE HEART FAILURE, UNSPECIFIED HEART FAILURE TYPE: ICD-10-CM

## 2024-09-17 LAB
AORTIC ROOT ANNULUS: 2.65 CM
ASCENDING AORTA: 2.79 CM
AV INDEX (PROSTH): 0.65
AV MEAN GRADIENT: 5 MMHG
AV PEAK GRADIENT: 10 MMHG
AV VALVE AREA BY VELOCITY RATIO: 2.01 CM²
AV VALVE AREA: 2.09 CM²
AV VELOCITY RATIO: 0.62
BSA FOR ECHO PROCEDURE: 2 M2
CV ECHO LV RWT: 0.43 CM
DOP CALC AO PEAK VEL: 1.56 M/S
DOP CALC AO VTI: 31.7 CM
DOP CALC LVOT AREA: 3.2 CM2
DOP CALC LVOT DIAMETER: 2.03 CM
DOP CALC LVOT PEAK VEL: 0.97 M/S
DOP CALC LVOT STROKE VOLUME: 66.32 CM3
DOP CALC RVOT PEAK VEL: 0.57 M/S
DOP CALC RVOT VTI: 14 CM
DOP CALCLVOT PEAK VEL VTI: 20.5 CM
E WAVE DECELERATION TIME: 156.13 MSEC
E/A RATIO: 0.81
E/E' RATIO: 8.71 M/S
ECHO LV POSTERIOR WALL: 1.17 CM (ref 0.6–1.1)
EJECTION FRACTION: 50 %
FRACTIONAL SHORTENING: 30 % (ref 28–44)
INTERVENTRICULAR SEPTUM: 1.02 CM (ref 0.6–1.1)
IVRT: 99.9 MSEC
LA MAJOR: 4.64 CM
LA MINOR: 4.42 CM
LA WIDTH: 3.9 CM
LEFT ATRIUM AREA SYSTOLIC (APICAL 2 CHAMBER): 15.22 CM2
LEFT ATRIUM AREA SYSTOLIC (APICAL 4 CHAMBER): 15.61 CM2
LEFT ATRIUM SIZE: 3.77 CM
LEFT ATRIUM VOLUME INDEX MOD: 21.6 ML/M2
LEFT ATRIUM VOLUME INDEX: 28.7 ML/M2
LEFT ATRIUM VOLUME MOD: 42.5 CM3
LEFT ATRIUM VOLUME: 56.58 CM3
LEFT INTERNAL DIMENSION IN SYSTOLE: 3.78 CM (ref 2.1–4)
LEFT VENTRICLE DIASTOLIC VOLUME INDEX: 71.1 ML/M2
LEFT VENTRICLE DIASTOLIC VOLUME: 140.07 ML
LEFT VENTRICLE END SYSTOLIC VOLUME APICAL 2 CHAMBER: 39.75 ML
LEFT VENTRICLE END SYSTOLIC VOLUME APICAL 4 CHAMBER: 43.67 ML
LEFT VENTRICLE MASS INDEX: 118 G/M2
LEFT VENTRICLE SYSTOLIC VOLUME INDEX: 31.1 ML/M2
LEFT VENTRICLE SYSTOLIC VOLUME: 61.25 ML
LEFT VENTRICULAR INTERNAL DIMENSION IN DIASTOLE: 5.38 CM (ref 3.5–6)
LEFT VENTRICULAR MASS: 231.96 G
LV LATERAL E/E' RATIO: 10.57 M/S
LV SEPTAL E/E' RATIO: 7.4 M/S
LVED V (TEICH): 140.07 ML
LVES V (TEICH): 61.25 ML
LVOT MG: 2.17 MMHG
LVOT MV: 0.69 CM/S
MV PEAK A VEL: 0.91 M/S
MV PEAK E VEL: 0.74 M/S
MV STENOSIS PRESSURE HALF TIME: 45.28 MS
MV VALVE AREA P 1/2 METHOD: 4.86 CM2
OHS CV RV/LV RATIO: 0.64 CM
PISA TR MAX VEL: 2.69 M/S
PULM VEIN S/D RATIO: 1.19
PV MEAN GRADIENT: 1 MMHG
PV PEAK D VEL: 0.59 M/S
PV PEAK GRADIENT: 6
PV PEAK S VEL: 0.7 M/S
PV PEAK VELOCITY: 1.18 M/S
RA MAJOR: 3.99 CM
RA PRESSURE ESTIMATED: 3 MMHG
RA WIDTH: 3.86 CM
RIGHT VENTRICULAR END-DIASTOLIC DIMENSION: 3.45 CM
RV TB RVSP: 6 MMHG
SINUS: 2.29 CM
STJ: 2.06 CM
TDI LATERAL: 0.07 M/S
TDI SEPTAL: 0.1 M/S
TDI: 0.09 M/S
TR MAX PG: 29 MMHG
TV REST PULMONARY ARTERY PRESSURE: 32 MMHG
Z-SCORE OF LEFT VENTRICULAR DIMENSION IN END DIASTOLE: -0.51
Z-SCORE OF LEFT VENTRICULAR DIMENSION IN END SYSTOLE: 0.65

## 2024-09-17 PROCEDURE — 93306 TTE W/DOPPLER COMPLETE: CPT

## 2024-09-17 PROCEDURE — 93306 TTE W/DOPPLER COMPLETE: CPT | Mod: 26,,, | Performed by: INTERNAL MEDICINE

## 2024-09-17 NOTE — PROGRESS NOTES
Please phone patient. TTE reviewed. Great news! His EF is nearly normal at 50%. He may return to work with no restrictions. Ok to return LifeVest as well.    Please make sure he has 6-10 week f/u with Dr. Gonzalez.

## 2024-09-17 NOTE — TELEPHONE ENCOUNTER
Informed pt TTE reviewed. Great news! His EF is nearly normal at 50%. He may return to work with no restrictions, and Ok to return LifeVest. Also inform him of appt for a 1m f/u w/ Dr. Gonzalez on 10/29/24. Pt is requesting a letter to RTW w/o restrictions, and is also wanting to know if he brings the LifeVest back to clinic. Pt vu w/o q/c  Pt okay w/ a return call tomorrow    --- Outgoing ---  Attempted to contact pt to inform him TTE reviewed. Great news! His EF is nearly normal at 50%. He may return to work with no restrictions, and Ok to return LifeVest. Also inform him of appt for a 1m f/u w/ Dr. Gonzalez on 10/29/24. No answer, VM full      ----- Message from Keli Sams PA-C sent at 9/17/2024  3:58 PM CDT -----  Please phone patient. TTE reviewed. Great news! His EF is nearly normal at 50%. He may return to work with no restrictions. Ok to return LifeVest as well.    Please make sure he has 6-10 week f/u with Dr. Gonzalez.

## 2024-09-18 ENCOUNTER — TELEPHONE (OUTPATIENT)
Dept: CARDIOLOGY | Facility: CLINIC | Age: 49
End: 2024-09-18
Payer: COMMERCIAL

## 2024-09-18 NOTE — TELEPHONE ENCOUNTER
Contacted pt and informed him his RTW letter will be ready tomorrow at [facility] after noon. Pt elana w/o q/c    ---- Outgoing 09/18/24 ----  Called pt back and informed him we are working on his RTW letter and will call him once it is ready. Also informed him to contact [employer] to return LifeVest. Pt elana w/o q.c    ----- Message from Nery Montilla sent at 9/18/2024 12:15 PM CDT -----  Contact: [phone]  Pt would like to know where he can  paperwork stating it is okay for him to return to work. Also, He would like to know where he needs to drop off heart monitor, He states he picked up the monitor through the hospital and I told him he should be able to drop it off where he picked it up at but he would like the nurse to call him and inform him of that. Please call pt. Thanks

## 2024-09-20 ENCOUNTER — PATIENT MESSAGE (OUTPATIENT)
Dept: CARDIOLOGY | Facility: CLINIC | Age: 49
End: 2024-09-20
Payer: COMMERCIAL

## 2024-09-27 ENCOUNTER — TELEPHONE (OUTPATIENT)
Dept: CARDIOLOGY | Facility: CLINIC | Age: 49
End: 2024-09-27
Payer: COMMERCIAL

## 2024-09-27 NOTE — TELEPHONE ENCOUNTER
Contacted Keli perez/ [facility] and informed her that pt is cleared. She will submit an EOU. She vu w/o q/c    ----- Message from Camille Orisaías sent at 9/27/2024 10:46 AM CDT -----  Contact: Keli [phone]  Keli calling from [employer] to find out if pt can end use on life vest.  Pt states he was cleared on Friday but she needs to confirm.  Please call to confirm.

## 2024-10-08 ENCOUNTER — TELEPHONE (OUTPATIENT)
Dept: CARDIOLOGY | Facility: CLINIC | Age: 49
End: 2024-10-08
Payer: COMMERCIAL

## 2024-10-08 NOTE — TELEPHONE ENCOUNTER
Called pt back and he will be coming by [facility] tomorrow morning to  a print out of EKG done 08/08/24. Pt elana w/o q/c    ----- Message from Catherine sent at 10/8/2024 11:01 AM CDT -----  Contact: Mobile  [phone]  Patient would like a copy of his latest EKG and he would like to come and pick the copy up on today or tomorrow, October ninth?

## 2024-10-29 PROBLEM — I25.2 OLD MI (MYOCARDIAL INFARCTION): Status: ACTIVE | Noted: 2024-07-31

## 2024-10-29 PROBLEM — I25.118 CORONARY ARTERY DISEASE OF NATIVE ARTERY OF NATIVE HEART WITH STABLE ANGINA PECTORIS: Status: ACTIVE | Noted: 2024-10-29

## 2024-10-29 PROBLEM — E78.2 MIXED HYPERLIPIDEMIA: Status: ACTIVE | Noted: 2024-07-31

## 2024-10-29 PROBLEM — Z98.61 HISTORY OF PTCA: Status: ACTIVE | Noted: 2024-10-29

## 2025-02-22 ENCOUNTER — HOSPITAL ENCOUNTER (EMERGENCY)
Facility: HOSPITAL | Age: 50
Discharge: HOME | End: 2025-02-22
Attending: EMERGENCY MEDICINE
Payer: COMMERCIAL

## 2025-02-22 VITALS
RESPIRATION RATE: 16 BRPM | WEIGHT: 195.56 LBS | HEART RATE: 82 BPM | BODY MASS INDEX: 30.63 KG/M2 | OXYGEN SATURATION: 96 % | TEMPERATURE: 98 F | SYSTOLIC BLOOD PRESSURE: 186 MMHG | DIASTOLIC BLOOD PRESSURE: 126 MMHG

## 2025-02-22 DIAGNOSIS — R03.0 ELEVATED BLOOD PRESSURE READING: ICD-10-CM

## 2025-02-22 DIAGNOSIS — I50.9 CHRONIC CONGESTIVE HEART FAILURE, UNSPECIFIED HEART FAILURE TYPE: ICD-10-CM

## 2025-02-22 DIAGNOSIS — K04.7 DENTAL ABSCESS: Primary | ICD-10-CM

## 2025-02-22 PROCEDURE — 99284 EMERGENCY DEPT VISIT MOD MDM: CPT | Mod: ER

## 2025-02-22 RX ORDER — METOPROLOL SUCCINATE 50 MG/1
50 TABLET, EXTENDED RELEASE ORAL DAILY
Qty: 90 TABLET | Refills: 3 | Status: SHIPPED | OUTPATIENT
Start: 2025-02-22 | End: 2025-02-22

## 2025-02-22 RX ORDER — METOPROLOL SUCCINATE 50 MG/1
50 TABLET, EXTENDED RELEASE ORAL DAILY
Qty: 90 TABLET | Refills: 3 | Status: SHIPPED | OUTPATIENT
Start: 2025-02-22 | End: 2026-02-22

## 2025-02-22 RX ORDER — SACUBITRIL AND VALSARTAN 24; 26 MG/1; MG/1
1 TABLET, FILM COATED ORAL 2 TIMES DAILY
Qty: 60 TABLET | Refills: 11 | Status: SHIPPED | OUTPATIENT
Start: 2025-02-22 | End: 2025-02-22

## 2025-02-22 RX ORDER — AMOXICILLIN 500 MG/1
500 CAPSULE ORAL 3 TIMES DAILY
Qty: 21 CAPSULE | Refills: 0 | Status: SHIPPED | OUTPATIENT
Start: 2025-02-22 | End: 2025-02-22

## 2025-02-22 RX ORDER — LOSARTAN POTASSIUM 25 MG/1
25 TABLET ORAL DAILY
Qty: 90 TABLET | Refills: 3 | Status: SHIPPED | OUTPATIENT
Start: 2025-02-22 | End: 2025-02-22

## 2025-02-22 RX ORDER — LOSARTAN POTASSIUM 25 MG/1
25 TABLET ORAL DAILY
Qty: 90 TABLET | Refills: 3 | Status: SHIPPED | OUTPATIENT
Start: 2025-02-22 | End: 2026-02-22

## 2025-02-22 RX ORDER — AMOXICILLIN 500 MG/1
500 CAPSULE ORAL 3 TIMES DAILY
Qty: 21 CAPSULE | Refills: 0 | Status: SHIPPED | OUTPATIENT
Start: 2025-02-22 | End: 2025-03-01

## 2025-02-22 NOTE — Clinical Note
"Toño Sam"Leslie was seen and treated in our emergency department on 2/22/2025.  He may return to work on 02/23/2025.       If you have any questions or concerns, please don't hesitate to call.      Elijah Navarro NP"

## 2025-02-24 NOTE — ED PROVIDER NOTES
Encounter Date: 2/22/2025       History     Chief Complaint   Patient presents with    Dental Pain     Pt was eating something and it poked him. Swelling to left side mandible. Some relief with oragel     Patient complains of left-sided dental pain.  Patient's blood pressure elevated denies chest pain shortness breath or blurry vision states that he was frustrated with the cost of 1 of his blood pressure medications and discarded all of his blood pressure medications without following up with his doctor.        Review of patient's allergies indicates:  No Known Allergies  Past Medical History:   Diagnosis Date    Chronic pain     associated with injury    History of left heart catheterization      Past Surgical History:   Procedure Laterality Date    BRAIN SURGERY      EYE SURGERY      LEFT HEART CATHETERIZATION Left 7/31/2024    Procedure: Left heart cath;  Surgeon: Josh Gonzalez MD;  Location: [facility] CATH LAB;  Service: Cardiology;  Laterality: Left;    PERCUTANEOUS TRANSLUMINAL BALLOON ANGIOPLASTY OF CORONARY ARTERY  7/31/2024    Procedure: Angioplasty-coronary;  Surgeon: Josh Gonzalez MD;  Location: [facility] CATH LAB;  Service: Cardiology;;    SHOULDER SURGERY      post trauma fell 19 feet    STENT, DRUG ELUTING, SINGLE VESSEL, CORONARY  7/31/2024    Procedure: Stent, Drug Eluting, Single Vessel, Coronary;  Surgeon: Josh Gonzalez MD;  Location: [facility] CATH LAB;  Service: Cardiology;;     Family History   Problem Relation Name Age of Onset    Peripheral vascular disease Mother      Hypertension Mother      Cancer Father       Social History[1]  Review of Systems   Constitutional:  Negative for fever.   HENT:  Negative for sore throat.    Respiratory:  Negative for shortness of breath.    Cardiovascular:  Negative for chest pain.   Gastrointestinal:  Negative for nausea.   Genitourinary:  Negative for dysuria.   Musculoskeletal:  Negative for back pain.   Skin:  Negative for rash.   Neurological:  Negative for  weakness.   Hematological:  Does not bruise/bleed easily.       Physical Exam     Initial Vitals [02/22/25 1443]   BP Pulse Resp Temp SpO2   (!) 188/123 82 16 97.8 °F (36.6 °C) 96 %      MAP       --         Physical Exam    Constitutional: He appears well-developed and well-nourished.   HENT:   Head: Normocephalic and atraumatic.   Dental decay on the left side upper and lower.  No identified abscess.  Normal range of motion in the neck   Eyes: Conjunctivae are normal. Pupils are equal, round, and reactive to light.   Neck: Neck supple.   Normal range of motion.  Cardiovascular:  Normal rate, regular rhythm, normal heart sounds and intact distal pulses.           Pulmonary/Chest: Breath sounds normal.   Abdominal: Abdomen is soft. There is no rebound and no guarding.   Musculoskeletal:         General: Normal range of motion.      Cervical back: Normal range of motion and neck supple.     Neurological: He is alert.   Skin: Skin is warm and dry.   Psychiatric: He has a normal mood and affect. His behavior is normal. Thought content normal.         ED Course   Procedures  Labs Reviewed - No data to display       Imaging Results    None          Medications - No data to display  Medical Decision Making  Differential diagnosis considered but not limited to; dental caries, dental decay, dental abscess.  Elevated blood pressure reading.  Expressed my concerns with the patient about his blood pressure.  He states that he has not he really heel for that.  I did represcribed some of his medications and change the medication that he could not afford.  I advised him to follow up with his doctors    Risk  Prescription drug management.                                      Clinical Impression:  Final diagnoses:  [K04.7] Dental abscess (Primary)  [R03.0] Elevated blood pressure reading          ED Disposition Condition    Discharge Stable          ED Prescriptions       Medication Sig Dispense Start Date End Date Auth. Provider     sacubitriL-valsartan (ENTRESTO) 24-26 mg per tablet  (Status: Discontinued) Take 1 tablet by mouth 2 (two) times daily. 60 tablet 2/22/2025 2/22/2025 Elijah Navarro NP    metoprolol succinate (TOPROL-XL) 50 MG 24 hr tablet  (Status: Discontinued) Take 1 tablet (50 mg total) by mouth once daily. 90 tablet 2/22/2025 2/22/2025 Elijah Navarro NP    losartan (COZAAR) 25 MG tablet  (Status: Discontinued) Take 1 tablet (25 mg total) by mouth once daily. 90 tablet 2/22/2025 2/22/2025 Elijah Navarro NP    amoxicillin (AMOXIL) 500 MG capsule  (Status: Discontinued) Take 1 capsule (500 mg total) by mouth 3 (three) times daily. for 7 days 21 capsule 2/22/2025 2/22/2025 Elijah Navarro NP    amoxicillin (AMOXIL) 500 MG capsule Take 1 capsule (500 mg total) by mouth 3 (three) times daily. for 7 days 21 capsule 2/22/2025 3/1/2025 Elijah Navarro NP    losartan (COZAAR) 25 MG tablet Take 1 tablet (25 mg total) by mouth once daily. 90 tablet 2/22/2025 2/22/2026 Elijah Navarro NP    metoprolol succinate (TOPROL-XL) 50 MG 24 hr tablet Take 1 tablet (50 mg total) by mouth once daily. 90 tablet 2/22/2025 2/22/2026 Elijah Navarro NP          Follow-up Information       Follow up With Specialties Details Why Contact Info    [facility] CARDIOLOGY Cardiology Schedule an appointment as soon as possible for a visit in 2 days  [address]  [facility] [address] [phone]               [1]   Social History  Tobacco Use    Smoking status: Every Day     Current packs/day: 2.00     Types: Cigarettes    Smokeless tobacco: Never   Substance Use Topics    Alcohol use: No    Drug use: Yes        Elijah Navarro NP  02/23/25 2027

## 2025-03-06 DIAGNOSIS — I50.9 CHRONIC CONGESTIVE HEART FAILURE, UNSPECIFIED HEART FAILURE TYPE: ICD-10-CM

## 2025-03-06 DIAGNOSIS — I21.9 MYOCARDIAL INFARCTION, UNSPECIFIED MI TYPE, UNSPECIFIED ARTERY: ICD-10-CM

## 2025-03-06 DIAGNOSIS — I25.5 ISCHEMIC CARDIOMYOPATHY: ICD-10-CM

## 2025-03-06 DIAGNOSIS — I10 PRIMARY HYPERTENSION: ICD-10-CM

## 2025-03-06 RX ORDER — METOPROLOL SUCCINATE 50 MG/1
50 TABLET, EXTENDED RELEASE ORAL DAILY
Qty: 90 TABLET | Refills: 3 | Status: SHIPPED | OUTPATIENT
Start: 2025-03-06 | End: 2025-03-07 | Stop reason: SDUPTHER

## 2025-03-06 RX ORDER — PRASUGREL 10 MG/1
10 TABLET, FILM COATED ORAL DAILY
Qty: 30 TABLET | Refills: 11 | Status: SHIPPED | OUTPATIENT
Start: 2025-03-06 | End: 2025-03-07 | Stop reason: SDUPTHER

## 2025-03-06 RX ORDER — LOSARTAN POTASSIUM 25 MG/1
25 TABLET ORAL DAILY
Qty: 90 TABLET | Refills: 3 | Status: SHIPPED | OUTPATIENT
Start: 2025-03-06 | End: 2025-03-07 | Stop reason: SDUPTHER

## 2025-03-06 NOTE — TELEPHONE ENCOUNTER
Called pt back regarding rx. Pt states he needs a refill on all of CV meds and that he just doesn't feel right. He states he doesn't remember which med it was, but there was one rx he never picked up due to cost because insurance didn't cover it. He is wanting that med filled now and willing to pay for it because he doesn't feel right and is very tired. Offered appt w/ Dr. Gonzalez tomorrow - pt declined appt and states he has to work OT tomorrow. Got pt scheduled w/ C. KATHIA Rush on 03/10/25 and sending refills to [facility] (LOV 09/10/24). Pt vu w/o q/c      ----- Message from Cally sent at 3/6/2025  7:01 AM CST -----  .Type:  Sooner Apoointment RequestCaller is requesting a sooner appointment.  Caller declined first available appointment listed below.  Caller will not accept being placed on the waitlist and is requesting a message be sent to doctor.Name of Caller:.Toño Mcdonald Leslie When is the first available appointment?04/2025Symptoms:check upWould the patient rather a call back or a response via [facility]? Call [facility] Call Back Number:.[phone]   Additional Information: Patient also would like the prescriptions for his heart called in to the pharmacy. Call the patient back to advise. [place] [email or web address] [facility] [address] [facility] [facility] [address]Ncsal: [phone] Fax: [phone]

## 2025-03-07 DIAGNOSIS — Z00.00 ROUTINE ADULT HEALTH MAINTENANCE: ICD-10-CM

## 2025-03-07 DIAGNOSIS — I10 ESSENTIAL HYPERTENSION: Primary | ICD-10-CM

## 2025-03-07 DIAGNOSIS — I50.9 CHRONIC CONGESTIVE HEART FAILURE, UNSPECIFIED HEART FAILURE TYPE: ICD-10-CM

## 2025-03-07 RX ORDER — METOPROLOL SUCCINATE 50 MG/1
50 TABLET, EXTENDED RELEASE ORAL DAILY
Qty: 90 TABLET | Refills: 3 | Status: SHIPPED | OUTPATIENT
Start: 2025-03-07 | End: 2026-03-07

## 2025-03-07 RX ORDER — LOSARTAN POTASSIUM 25 MG/1
25 TABLET ORAL DAILY
Qty: 90 TABLET | Refills: 3 | Status: SHIPPED | OUTPATIENT
Start: 2025-03-07 | End: 2026-03-07

## 2025-05-19 ENCOUNTER — TELEPHONE (OUTPATIENT)
Dept: CARDIOLOGY | Facility: CLINIC | Age: 50
End: 2025-05-19
Payer: COMMERCIAL

## 2025-05-19 NOTE — TELEPHONE ENCOUNTER
Attempted to call pt back to schedule appt. Pt lost signal/hung up. Attempted to call back 3 more times without success and VM is full.    Copied from CRM #[id number]. Topic: Appointments - Appointment Scheduling  >> May 19, 2025  7:38 AM Carmelo wrote:   Type:  Sooner Apoointment Request    Caller is requesting a sooner appointment.  Caller declined first available appointment listed below.  Caller will not accept being placed on the waitlist and is requesting a message be sent to doctor.  Name of Caller: Toño  When is the first available appointment? 6-  Symptoms:  tired  Would the patient rather a call back or a response via [facility]? Call back   Best Call Back Number: [phone]  Additional Information:      Symptom: Lethargic (Tired)  Outcome: Schedule an appointment to be seen within 3 days.  Reason: Caller denied all higher acuity questions    The caller accepted this outcome.

## 2025-07-09 ENCOUNTER — PATIENT MESSAGE (OUTPATIENT)
Dept: CARDIOLOGY | Facility: CLINIC | Age: 50
End: 2025-07-09
Payer: COMMERCIAL

## 2025-07-09 ENCOUNTER — TELEPHONE (OUTPATIENT)
Dept: CARDIOLOGY | Facility: CLINIC | Age: 50
End: 2025-07-09
Payer: COMMERCIAL

## 2025-07-09 NOTE — TELEPHONE ENCOUNTER
Called no answer- could not leave a messge    Copied from CRM #[id number]. Topic: General Inquiry - Return Call  >> Jul 9, 2025  1:22 PM Candy wrote:  Type:  Patient Returning Call    Who Called:Toño  Who Left Message for Patient:  Does the patient know what this is regarding?: scheduling  Would the patient rather a call back or a response via [facility]?  Call back  Best Call Back Number: [phone]  Additional Information:

## 2025-08-14 DIAGNOSIS — I21.02 ST ELEVATION MYOCARDIAL INFARCTION INVOLVING LEFT ANTERIOR DESCENDING (LAD) CORONARY ARTERY: ICD-10-CM

## 2025-08-14 DIAGNOSIS — E78.5 HYPERLIPIDEMIA, UNSPECIFIED HYPERLIPIDEMIA TYPE: ICD-10-CM

## 2025-08-14 RX ORDER — ATORVASTATIN CALCIUM 80 MG/1
80 TABLET, FILM COATED ORAL NIGHTLY
Qty: 90 TABLET | Refills: 0 | OUTPATIENT
Start: 2025-08-14

## (undated) DEVICE — GUIDEWIRE WHOLEY HI TORQ 175CM

## (undated) DEVICE — CATH JR4 5FR

## (undated) DEVICE — BAND TR COMP DEVICE REG 24CM

## (undated) DEVICE — NDL PERCUTANEOUS 21G 7CM VASC

## (undated) DEVICE — PACK HEART CATH BR

## (undated) DEVICE — CATH PIG145 INFINITI 5X110CM

## (undated) DEVICE — KIT GLIDESHEATH SLEND 6FR 10CM

## (undated) DEVICE — CATH SAPPH II PRO SC 2X15MM

## (undated) DEVICE — GUIDE LAUNCHER 6FR EBU 3.5

## (undated) DEVICE — KIT WATCHDOG HEMSTAS VALVE 8FR

## (undated) DEVICE — PAD DEFIB CADENCE ADULT R2

## (undated) DEVICE — GUIDEWIRE FIELDER XT.014X190CM

## (undated) DEVICE — INFLATOR ENCORE 26 BLLN INFL

## (undated) DEVICE — WIRE GUIDE TEFLON 3CM .035 145

## (undated) DEVICE — KIT MANIFOLD LOW PRESS TUBING

## (undated) DEVICE — CATH AL 1.0 5/BX

## (undated) DEVICE — WIRE X-SUP CHOICE PT .014X182

## (undated) DEVICE — ANGIOTOUCH KIT

## (undated) DEVICE — DRAPE ANGIO BRACH 38X44IN

## (undated) DEVICE — PACK ANGIOPLASTY ACCESS PLUS

## (undated) DEVICE — OMNIPAQUE 300MG 150ML VIAL

## (undated) DEVICE — KIT SYR REUSABLE

## (undated) DEVICE — CATH INFINITI 4F 3DRC 100CM